# Patient Record
Sex: MALE | Race: WHITE | Employment: OTHER | URBAN - NONMETROPOLITAN AREA
[De-identification: names, ages, dates, MRNs, and addresses within clinical notes are randomized per-mention and may not be internally consistent; named-entity substitution may affect disease eponyms.]

---

## 2018-07-14 ENCOUNTER — APPOINTMENT (OUTPATIENT)
Dept: CT IMAGING | Age: 53
End: 2018-07-14

## 2018-07-14 ENCOUNTER — HOSPITAL ENCOUNTER (EMERGENCY)
Age: 53
Discharge: HOME | End: 2018-07-14

## 2018-07-14 VITALS
SYSTOLIC BLOOD PRESSURE: 130 MMHG | OXYGEN SATURATION: 97 % | WEIGHT: 150 LBS | HEIGHT: 70 IN | RESPIRATION RATE: 16 BRPM | HEART RATE: 74 BPM | BODY MASS INDEX: 21.47 KG/M2 | DIASTOLIC BLOOD PRESSURE: 82 MMHG | TEMPERATURE: 98 F
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
## 2018-07-14 DIAGNOSIS — S01.81XA FACIAL LACERATION, INITIAL ENCOUNTER: ICD-10-CM

## 2018-07-14 DIAGNOSIS — S00.93XA CONTUSION OF HEAD, UNSPECIFIED PART OF HEAD, INITIAL ENCOUNTER: ICD-10-CM

## 2018-07-14 PROCEDURE — 99283 EMERGENCY DEPT VISIT LOW MDM: CPT

## 2018-07-14 PROCEDURE — 90471 IMMUNIZATION ADMIN: CPT | Performed by: PHYSICIAN ASSISTANT

## 2018-07-14 PROCEDURE — 70486 CT MAXILLOFACIAL W/O DYE: CPT

## 2018-07-14 PROCEDURE — 12013 RPR F/E/E/N/L/M 2.6-5.0 CM: CPT

## 2018-07-14 PROCEDURE — 6360000002 HC RX W HCPCS: Performed by: PHYSICIAN ASSISTANT

## 2018-07-14 PROCEDURE — 90715 TDAP VACCINE 7 YRS/> IM: CPT | Performed by: PHYSICIAN ASSISTANT

## 2018-07-14 PROCEDURE — 70450 CT HEAD/BRAIN W/O DYE: CPT

## 2018-07-14 RX ORDER — ACETAMINOPHEN 500 MG
1000 TABLET ORAL DAILY
COMMUNITY

## 2018-07-14 RX ORDER — LIDOCAINE HYDROCHLORIDE 10 MG/ML
INJECTION, SOLUTION INFILTRATION; PERINEURAL
Status: DISCONTINUED
Start: 2018-07-14 | End: 2018-07-14 | Stop reason: HOSPADM

## 2018-07-14 RX ADMIN — TETANUS TOXOID, REDUCED DIPHTHERIA TOXOID AND ACELLULAR PERTUSSIS VACCINE, ADSORBED 0.5 ML: 5; 2.5; 8; 8; 2.5 SUSPENSION INTRAMUSCULAR at 09:33

## 2018-07-14 ASSESSMENT — ENCOUNTER SYMPTOMS
EYE PAIN: 0
SORE THROAT: 0
HEMOPTYSIS: 0
ABDOMINAL PAIN: 0
COUGH: 0
NAUSEA: 0
HEARTBURN: 0
VOMITING: 0
STRIDOR: 0
SHORTNESS OF BREATH: 0
DIARRHEA: 0

## 2018-07-14 ASSESSMENT — PAIN SCALES - GENERAL: PAINLEVEL_OUTOF10: 5

## 2018-07-14 ASSESSMENT — PAIN DESCRIPTION - DESCRIPTORS: DESCRIPTORS: DISCOMFORT

## 2018-07-14 ASSESSMENT — PAIN DESCRIPTION - PAIN TYPE: TYPE: ACUTE PAIN

## 2018-07-14 ASSESSMENT — PAIN DESCRIPTION - ORIENTATION: ORIENTATION: LEFT;UPPER

## 2018-07-14 ASSESSMENT — PAIN DESCRIPTION - LOCATION: LOCATION: EYE
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
## 2020-05-20 ENCOUNTER — HOSPITAL ENCOUNTER (OUTPATIENT)
Age: 55
Discharge: HOME | End: 2020-05-20
Payer: MEDICARE

## 2020-05-20 ENCOUNTER — HOSPITAL ENCOUNTER (OUTPATIENT)
Dept: GENERAL RADIOLOGY | Age: 55
Discharge: HOME | End: 2020-05-20
Payer: MEDICARE

## 2020-05-20 PROCEDURE — 73030 X-RAY EXAM OF SHOULDER: CPT

## 2020-07-21 ENCOUNTER — HOSPITAL ENCOUNTER (OUTPATIENT)
Dept: NON INVASIVE DIAGNOSTICS | Age: 55
Discharge: HOME | End: 2020-07-21
Payer: MEDICARE

## 2020-07-21 ENCOUNTER — HOSPITAL ENCOUNTER (OUTPATIENT)
Age: 55
Discharge: HOME | End: 2020-07-21
Payer: MEDICARE

## 2020-07-21 VITALS — WEIGHT: 150 LBS | BODY MASS INDEX: 22.22 KG/M2 | HEIGHT: 69 IN

## 2020-07-21 PROCEDURE — 78452 HT MUSCLE IMAGE SPECT MULT: CPT

## 2020-07-21 PROCEDURE — 93017 CV STRESS TEST TRACING ONLY: CPT

## 2020-07-21 PROCEDURE — 6360000002 HC RX W HCPCS

## 2020-07-21 PROCEDURE — U0003 INFECTIOUS AGENT DETECTION BY NUCLEIC ACID (DNA OR RNA); SEVERE ACUTE RESPIRATORY SYNDROME CORONAVIRUS 2 (SARS-COV-2) (CORONAVIRUS DISEASE [COVID-19]), AMPLIFIED PROBE TECHNIQUE, MAKING USE OF HIGH THROUGHPUT TECHNOLOGIES AS DESCRIBED BY CMS-2020-01-R: HCPCS

## 2020-07-21 PROCEDURE — A9500 TC99M SESTAMIBI: HCPCS | Performed by: INTERNAL MEDICINE

## 2020-07-21 PROCEDURE — 3430000000 HC RX DIAGNOSTIC RADIOPHARMACEUTICAL: Performed by: INTERNAL MEDICINE

## 2020-07-21 RX ADMIN — Medication 28.6 MILLICURIE: at 10:08

## 2020-07-21 RX ADMIN — Medication 8.9 MILLICURIE: at 09:10

## 2020-07-21 NOTE — PROGRESS NOTES
PAT call attempted, patient unavailable, left message to please call us back at your earliest convenience; [phone]

## 2020-07-21 NOTE — PROGRESS NOTES
Cervical Surgery Pre-op Instructions   Nothing to eat or drink after midnight the night before surgery except sips of water to take medications   Bring photo ID and any health insurance cards   Wear comfortable clean loose fitting clothing   Do not wear any jewelry    Bring your medication in the original bottles   Bring your CPAP machine if you have one   Wear clean clothes to bed and place fresh clean sheets and pillowcases on your bed the night before surgery   Bathing:  o __x__Shower 2 days before, day before and morning of surgery using the StartClean® kit provided (follow the instructions provided with the kit), OR   o ____Cleanse your surgical site, 2 days before, day before and morning of surgery using the Raymundo 2% CHG cloths® provided (follow the instructions provided).  Do not shave the surgical area! If needed, your nurse will use clippers to remove any excess hair at the surgical site when you come in for surgery. Do not use a razor on the site of your surgery for at least 2 days prior to surgery because shaving can leave tiny nicks in the skin which may allow germs to enter and cause infection. If you have any questions about your preoperative preparations, please call the [address] [place] [address] department at [phone].

## 2020-07-24 LAB — SARS-COV-2: NOT DETECTED

## 2020-07-24 NOTE — PROGRESS NOTES
Called medical records at [facility] and left message for [facility] requesting copy of clearance

## 2020-07-27 ENCOUNTER — HOSPITAL ENCOUNTER (INPATIENT)
Age: 55
LOS: 1 days | Discharge: HOME | DRG: 472 | End: 2020-07-28
Attending: ORTHOPAEDIC SURGERY | Admitting: ORTHOPAEDIC SURGERY
Payer: MEDICARE

## 2020-07-27 ENCOUNTER — ANESTHESIA (OUTPATIENT)
Dept: OPERATING ROOM | Age: 55
DRG: 472 | End: 2020-07-27
Payer: MEDICARE

## 2020-07-27 ENCOUNTER — APPOINTMENT (OUTPATIENT)
Dept: GENERAL RADIOLOGY | Age: 55
DRG: 472 | End: 2020-07-27
Attending: ORTHOPAEDIC SURGERY
Payer: MEDICARE

## 2020-07-27 ENCOUNTER — ANESTHESIA EVENT (OUTPATIENT)
Dept: OPERATING ROOM | Age: 55
DRG: 472 | End: 2020-07-27
Payer: MEDICARE

## 2020-07-27 VITALS
RESPIRATION RATE: 8 BRPM | TEMPERATURE: 97.2 F | SYSTOLIC BLOOD PRESSURE: 116 MMHG | OXYGEN SATURATION: 100 % | DIASTOLIC BLOOD PRESSURE: 69 MMHG

## 2020-07-27 PROBLEM — M43.12 SPONDYLOLISTHESIS OF CERVICAL REGION: Status: ACTIVE | Noted: 2020-07-27

## 2020-07-27 LAB
ABO: NORMAL
ANTIBODY SCREEN: NORMAL
RH FACTOR: NORMAL

## 2020-07-27 PROCEDURE — 6360000002 HC RX W HCPCS: Performed by: PHYSICIAN ASSISTANT

## 2020-07-27 PROCEDURE — 36415 COLL VENOUS BLD VENIPUNCTURE: CPT

## 2020-07-27 PROCEDURE — 86900 BLOOD TYPING SEROLOGIC ABO: CPT

## 2020-07-27 PROCEDURE — 94640 AIRWAY INHALATION TREATMENT: CPT

## 2020-07-27 PROCEDURE — 72020 X-RAY EXAM OF SPINE 1 VIEW: CPT

## 2020-07-27 PROCEDURE — 01N10ZZ RELEASE CERVICAL NERVE, OPEN APPROACH: ICD-10-PCS | Performed by: ORTHOPAEDIC SURGERY

## 2020-07-27 PROCEDURE — 3700000001 HC ADD 15 MINUTES (ANESTHESIA): Performed by: ORTHOPAEDIC SURGERY

## 2020-07-27 PROCEDURE — 6360000002 HC RX W HCPCS: Performed by: NURSE ANESTHETIST, CERTIFIED REGISTERED

## 2020-07-27 PROCEDURE — 7100000000 HC PACU RECOVERY - FIRST 15 MIN: Performed by: ORTHOPAEDIC SURGERY

## 2020-07-27 PROCEDURE — 2720000010 HC SURG SUPPLY STERILE: Performed by: ORTHOPAEDIC SURGERY

## 2020-07-27 PROCEDURE — 3600000004 HC SURGERY LEVEL 4 BASE: Performed by: ORTHOPAEDIC SURGERY

## 2020-07-27 PROCEDURE — 86850 RBC ANTIBODY SCREEN: CPT

## 2020-07-27 PROCEDURE — 3700000000 HC ANESTHESIA ATTENDED CARE: Performed by: ORTHOPAEDIC SURGERY

## 2020-07-27 PROCEDURE — 2500000003 HC RX 250 WO HCPCS: Performed by: NURSE ANESTHETIST, CERTIFIED REGISTERED

## 2020-07-27 PROCEDURE — 3600000014 HC SURGERY LEVEL 4 ADDTL 15MIN: Performed by: ORTHOPAEDIC SURGERY

## 2020-07-27 PROCEDURE — 2580000003 HC RX 258: Performed by: ORTHOPAEDIC SURGERY

## 2020-07-27 PROCEDURE — 1200000000 HC SEMI PRIVATE

## 2020-07-27 PROCEDURE — 0RB30ZZ EXCISION OF CERVICAL VERTEBRAL DISC, OPEN APPROACH: ICD-10-PCS | Performed by: ORTHOPAEDIC SURGERY

## 2020-07-27 PROCEDURE — 2500000003 HC RX 250 WO HCPCS: Performed by: ORTHOPAEDIC SURGERY

## 2020-07-27 PROCEDURE — 2709999900 HC NON-CHARGEABLE SUPPLY: Performed by: ORTHOPAEDIC SURGERY

## 2020-07-27 PROCEDURE — L0120 CERV FLEX N/ADJ FOAM PRE OTS: HCPCS | Performed by: ORTHOPAEDIC SURGERY

## 2020-07-27 PROCEDURE — 0RG20A0 FUSION OF 2 OR MORE CERVICAL VERTEBRAL JOINTS WITH INTERBODY FUSION DEVICE, ANTERIOR APPROACH, ANTERIOR COLUMN, OPEN APPROACH: ICD-10-PCS | Performed by: ORTHOPAEDIC SURGERY

## 2020-07-27 PROCEDURE — 94760 N-INVAS EAR/PLS OXIMETRY 1: CPT

## 2020-07-27 PROCEDURE — 6370000000 HC RX 637 (ALT 250 FOR IP): Performed by: PHYSICIAN ASSISTANT

## 2020-07-27 PROCEDURE — 00NW0ZZ RELEASE CERVICAL SPINAL CORD, OPEN APPROACH: ICD-10-PCS | Performed by: ORTHOPAEDIC SURGERY

## 2020-07-27 PROCEDURE — C1713 ANCHOR/SCREW BN/BN,TIS/BN: HCPCS | Performed by: ORTHOPAEDIC SURGERY

## 2020-07-27 PROCEDURE — 0RG20K0 FUSION OF 2 OR MORE CERVICAL VERTEBRAL JOINTS WITH NONAUTOLOGOUS TISSUE SUBSTITUTE, ANTERIOR APPROACH, ANTERIOR COLUMN, OPEN APPROACH: ICD-10-PCS | Performed by: ORTHOPAEDIC SURGERY

## 2020-07-27 PROCEDURE — 2780000010 HC IMPLANT OTHER: Performed by: ORTHOPAEDIC SURGERY

## 2020-07-27 PROCEDURE — 86901 BLOOD TYPING SEROLOGIC RH(D): CPT

## 2020-07-27 PROCEDURE — 2580000003 HC RX 258: Performed by: PHYSICIAN ASSISTANT

## 2020-07-27 PROCEDURE — 7100000001 HC PACU RECOVERY - ADDTL 15 MIN: Performed by: ORTHOPAEDIC SURGERY

## 2020-07-27 PROCEDURE — 6370000000 HC RX 637 (ALT 250 FOR IP): Performed by: ANESTHESIOLOGY

## 2020-07-27 DEVICE — DBM T41120 1CC GRAFTON GEL
Type: IMPLANTABLE DEVICE | Site: NECK | Status: FUNCTIONAL
Brand: GRAFTON®AND GRAFTON PLUS®DEMINERALIZED BONE MATRIX (DBM)

## 2020-07-27 DEVICE — SCREW 3120515 4.0 X 15 SELF DRILL VAR
Type: IMPLANTABLE DEVICE | Site: NECK | Status: FUNCTIONAL
Brand: ATLANTIS® ANTERIOR CERVICAL PLATE SYSTEM

## 2020-07-27 DEVICE — GRAFT HUM TISS W11XH7XL11MM CORT INTBDY FUS FRZ DRY BLK: Type: IMPLANTABLE DEVICE | Site: NECK | Status: FUNCTIONAL

## 2020-07-27 DEVICE — GRAFT BNE SUB W11XH7XL14MM CORT INTBDY FUS FRZ DRY BLK SPCR: Type: IMPLANTABLE DEVICE | Site: NECK | Status: FUNCTIONAL

## 2020-07-27 DEVICE — AGENT HEMOSTATIC SURGIFLOW MATRIX KIT W/THROMBIN: Type: IMPLANTABLE DEVICE | Site: NECK | Status: FUNCTIONAL

## 2020-07-27 RX ORDER — LABETALOL 20 MG/4 ML (5 MG/ML) INTRAVENOUS SYRINGE
10 EVERY 10 MIN PRN
Status: DISCONTINUED | OUTPATIENT
Start: 2020-07-27 | End: 2020-07-27 | Stop reason: HOSPADM

## 2020-07-27 RX ORDER — CYCLOBENZAPRINE HCL 10 MG
10 TABLET ORAL 3 TIMES DAILY PRN
Status: DISCONTINUED | OUTPATIENT
Start: 2020-07-27 | End: 2020-07-28 | Stop reason: HOSPADM

## 2020-07-27 RX ORDER — OXYCODONE HYDROCHLORIDE AND ACETAMINOPHEN 5; 325 MG/1; MG/1
2 TABLET ORAL EVERY 4 HOURS PRN
Status: DISCONTINUED | OUTPATIENT
Start: 2020-07-27 | End: 2020-07-28 | Stop reason: HOSPADM

## 2020-07-27 RX ORDER — ONDANSETRON 2 MG/ML
4 INJECTION INTRAMUSCULAR; INTRAVENOUS EVERY 6 HOURS PRN
Status: DISCONTINUED | OUTPATIENT
Start: 2020-07-27 | End: 2020-07-28 | Stop reason: HOSPADM

## 2020-07-27 RX ORDER — POLYETHYLENE GLYCOL 3350 17 G/17G
17 POWDER, FOR SOLUTION ORAL DAILY
Status: DISCONTINUED | OUTPATIENT
Start: 2020-07-27 | End: 2020-07-28 | Stop reason: HOSPADM

## 2020-07-27 RX ORDER — FENTANYL CITRATE 50 UG/ML
50 INJECTION, SOLUTION INTRAMUSCULAR; INTRAVENOUS EVERY 5 MIN PRN
Status: DISCONTINUED | OUTPATIENT
Start: 2020-07-27 | End: 2020-07-27 | Stop reason: HOSPADM

## 2020-07-27 RX ORDER — MORPHINE SULFATE 2 MG/ML
2 INJECTION, SOLUTION INTRAMUSCULAR; INTRAVENOUS EVERY 5 MIN PRN
Status: DISCONTINUED | OUTPATIENT
Start: 2020-07-27 | End: 2020-07-27 | Stop reason: HOSPADM

## 2020-07-27 RX ORDER — DEXAMETHASONE SODIUM PHOSPHATE 4 MG/ML
8 INJECTION, SOLUTION INTRA-ARTICULAR; INTRALESIONAL; INTRAMUSCULAR; INTRAVENOUS; SOFT TISSUE EVERY 8 HOURS
Status: COMPLETED | OUTPATIENT
Start: 2020-07-27 | End: 2020-07-28

## 2020-07-27 RX ORDER — SODIUM CHLORIDE 9 MG/ML
INJECTION, SOLUTION INTRAVENOUS CONTINUOUS
Status: DISCONTINUED | OUTPATIENT
Start: 2020-07-27 | End: 2020-07-28 | Stop reason: HOSPADM

## 2020-07-27 RX ORDER — OXYCODONE HYDROCHLORIDE AND ACETAMINOPHEN 5; 325 MG/1; MG/1
1 TABLET ORAL EVERY 4 HOURS PRN
Status: DISCONTINUED | OUTPATIENT
Start: 2020-07-27 | End: 2020-07-28 | Stop reason: HOSPADM

## 2020-07-27 RX ORDER — ACETAMINOPHEN 325 MG/1
650 TABLET ORAL EVERY 4 HOURS PRN
Status: DISCONTINUED | OUTPATIENT
Start: 2020-07-27 | End: 2020-07-28 | Stop reason: HOSPADM

## 2020-07-27 RX ORDER — NEOSTIGMINE METHYLSULFATE 5 MG/5 ML
SYRINGE (ML) INTRAVENOUS PRN
Status: DISCONTINUED | OUTPATIENT
Start: 2020-07-27 | End: 2020-07-27 | Stop reason: SDUPTHER

## 2020-07-27 RX ORDER — IPRATROPIUM BROMIDE AND ALBUTEROL SULFATE 2.5; .5 MG/3ML; MG/3ML
1 SOLUTION RESPIRATORY (INHALATION) ONCE
Status: COMPLETED | OUTPATIENT
Start: 2020-07-27 | End: 2020-07-27

## 2020-07-27 RX ORDER — PROMETHAZINE HYDROCHLORIDE 25 MG/1
12.5 TABLET ORAL EVERY 6 HOURS PRN
Status: DISCONTINUED | OUTPATIENT
Start: 2020-07-27 | End: 2020-07-28 | Stop reason: HOSPADM

## 2020-07-27 RX ORDER — GLYCOPYRROLATE 1 MG/5 ML
SYRINGE (ML) INTRAVENOUS PRN
Status: DISCONTINUED | OUTPATIENT
Start: 2020-07-27 | End: 2020-07-27 | Stop reason: SDUPTHER

## 2020-07-27 RX ORDER — SODIUM CHLORIDE 0.9 % (FLUSH) 0.9 %
10 SYRINGE (ML) INJECTION EVERY 12 HOURS SCHEDULED
Status: DISCONTINUED | OUTPATIENT
Start: 2020-07-27 | End: 2020-07-28 | Stop reason: HOSPADM

## 2020-07-27 RX ORDER — SODIUM CHLORIDE 0.9 % (FLUSH) 0.9 %
10 SYRINGE (ML) INJECTION PRN
Status: DISCONTINUED | OUTPATIENT
Start: 2020-07-27 | End: 2020-07-28 | Stop reason: HOSPADM

## 2020-07-27 RX ORDER — LIDOCAINE HCL/PF 100 MG/5ML
SYRINGE (ML) INJECTION PRN
Status: DISCONTINUED | OUTPATIENT
Start: 2020-07-27 | End: 2020-07-27 | Stop reason: SDUPTHER

## 2020-07-27 RX ORDER — ROCURONIUM BROMIDE 10 MG/ML
INJECTION, SOLUTION INTRAVENOUS PRN
Status: DISCONTINUED | OUTPATIENT
Start: 2020-07-27 | End: 2020-07-27 | Stop reason: SDUPTHER

## 2020-07-27 RX ORDER — LIDOCAINE HYDROCHLORIDE AND EPINEPHRINE 10; 10 MG/ML; UG/ML
INJECTION, SOLUTION INFILTRATION; PERINEURAL PRN
Status: DISCONTINUED | OUTPATIENT
Start: 2020-07-27 | End: 2020-07-27 | Stop reason: ALTCHOICE

## 2020-07-27 RX ORDER — SODIUM PHOSPHATE, DIBASIC AND SODIUM PHOSPHATE, MONOBASIC 7; 19 G/133ML; G/133ML
1 ENEMA RECTAL DAILY PRN
Status: DISCONTINUED | OUTPATIENT
Start: 2020-07-27 | End: 2020-07-28 | Stop reason: HOSPADM

## 2020-07-27 RX ORDER — SODIUM CHLORIDE 9 MG/ML
INJECTION, SOLUTION INTRAVENOUS CONTINUOUS
Status: DISCONTINUED | OUTPATIENT
Start: 2020-07-27 | End: 2020-07-27

## 2020-07-27 RX ORDER — ONDANSETRON 2 MG/ML
INJECTION INTRAMUSCULAR; INTRAVENOUS PRN
Status: DISCONTINUED | OUTPATIENT
Start: 2020-07-27 | End: 2020-07-27 | Stop reason: SDUPTHER

## 2020-07-27 RX ORDER — DEXAMETHASONE SODIUM PHOSPHATE 4 MG/ML
INJECTION, SOLUTION INTRA-ARTICULAR; INTRALESIONAL; INTRAMUSCULAR; INTRAVENOUS; SOFT TISSUE PRN
Status: DISCONTINUED | OUTPATIENT
Start: 2020-07-27 | End: 2020-07-27 | Stop reason: SDUPTHER

## 2020-07-27 RX ORDER — EPHEDRINE SULFATE/0.9% NACL/PF 50 MG/5 ML
SYRINGE (ML) INTRAVENOUS PRN
Status: DISCONTINUED | OUTPATIENT
Start: 2020-07-27 | End: 2020-07-27 | Stop reason: SDUPTHER

## 2020-07-27 RX ORDER — BISACODYL 10 MG
10 SUPPOSITORY, RECTAL RECTAL DAILY PRN
Status: DISCONTINUED | OUTPATIENT
Start: 2020-07-27 | End: 2020-07-28 | Stop reason: HOSPADM

## 2020-07-27 RX ORDER — FENTANYL CITRATE 50 UG/ML
INJECTION, SOLUTION INTRAMUSCULAR; INTRAVENOUS PRN
Status: DISCONTINUED | OUTPATIENT
Start: 2020-07-27 | End: 2020-07-27 | Stop reason: SDUPTHER

## 2020-07-27 RX ORDER — SODIUM CHLORIDE 0.9 % (FLUSH) 0.9 %
10 SYRINGE (ML) INJECTION EVERY 12 HOURS SCHEDULED
Status: DISCONTINUED | OUTPATIENT
Start: 2020-07-27 | End: 2020-07-27 | Stop reason: HOSPADM

## 2020-07-27 RX ORDER — PROPOFOL 10 MG/ML
INJECTION, EMULSION INTRAVENOUS PRN
Status: DISCONTINUED | OUTPATIENT
Start: 2020-07-27 | End: 2020-07-27 | Stop reason: SDUPTHER

## 2020-07-27 RX ORDER — SODIUM CHLORIDE 0.9 % (FLUSH) 0.9 %
10 SYRINGE (ML) INJECTION PRN
Status: DISCONTINUED | OUTPATIENT
Start: 2020-07-27 | End: 2020-07-27 | Stop reason: HOSPADM

## 2020-07-27 RX ADMIN — Medication 20 MG: at 08:09

## 2020-07-27 RX ADMIN — PROPOFOL 150 MG: 10 INJECTION, EMULSION INTRAVENOUS at 07:30

## 2020-07-27 RX ADMIN — CEFAZOLIN 2 G: 10 INJECTION, POWDER, FOR SOLUTION INTRAVENOUS at 07:41

## 2020-07-27 RX ADMIN — FENTANYL CITRATE 50 MCG: 50 INJECTION, SOLUTION INTRAMUSCULAR; INTRAVENOUS at 09:44

## 2020-07-27 RX ADMIN — SODIUM CHLORIDE: 9 INJECTION, SOLUTION INTRAVENOUS at 20:50

## 2020-07-27 RX ADMIN — SODIUM CHLORIDE: 9 INJECTION, SOLUTION INTRAVENOUS at 13:56

## 2020-07-27 RX ADMIN — ROCURONIUM BROMIDE 20 MG: 10 INJECTION INTRAVENOUS at 07:49

## 2020-07-27 RX ADMIN — NALOXEGOL OXALATE 12.5 MG: 12.5 TABLET, FILM COATED ORAL at 18:31

## 2020-07-27 RX ADMIN — DEXAMETHASONE SODIUM PHOSPHATE 8 MG: 4 INJECTION, SOLUTION INTRAMUSCULAR; INTRAVENOUS at 22:35

## 2020-07-27 RX ADMIN — Medication 0.6 MG: at 10:12

## 2020-07-27 RX ADMIN — OXYCODONE HYDROCHLORIDE AND ACETAMINOPHEN 2 TABLET: 5; 325 TABLET ORAL at 20:49

## 2020-07-27 RX ADMIN — SODIUM CHLORIDE: 9 INJECTION, SOLUTION INTRAVENOUS at 22:39

## 2020-07-27 RX ADMIN — ROCURONIUM BROMIDE 50 MG: 10 INJECTION INTRAVENOUS at 07:30

## 2020-07-27 RX ADMIN — Medication 100 MG: at 07:30

## 2020-07-27 RX ADMIN — CEFAZOLIN 2 G: 10 INJECTION, POWDER, FOR SOLUTION INTRAVENOUS at 22:35

## 2020-07-27 RX ADMIN — POLYETHYLENE GLYCOL 3350 17 G: 17 POWDER, FOR SOLUTION ORAL at 18:31

## 2020-07-27 RX ADMIN — Medication 4 MG: at 10:12

## 2020-07-27 RX ADMIN — FENTANYL CITRATE 50 MCG: 50 INJECTION, SOLUTION INTRAMUSCULAR; INTRAVENOUS at 08:19

## 2020-07-27 RX ADMIN — DEXAMETHASONE SODIUM PHOSPHATE 8 MG: 4 INJECTION, SOLUTION INTRAMUSCULAR; INTRAVENOUS at 07:49

## 2020-07-27 RX ADMIN — ONDANSETRON HYDROCHLORIDE 4 MG: 4 INJECTION, SOLUTION INTRAMUSCULAR; INTRAVENOUS at 09:59

## 2020-07-27 RX ADMIN — FENTANYL CITRATE 100 MCG: 50 INJECTION, SOLUTION INTRAMUSCULAR; INTRAVENOUS at 07:30

## 2020-07-27 RX ADMIN — CEFAZOLIN 2 G: 10 INJECTION, POWDER, FOR SOLUTION INTRAVENOUS at 14:41

## 2020-07-27 RX ADMIN — IPRATROPIUM BROMIDE AND ALBUTEROL SULFATE 1 AMPULE: .5; 3 SOLUTION RESPIRATORY (INHALATION) at 07:11

## 2020-07-27 RX ADMIN — Medication 10 MG: at 09:36

## 2020-07-27 RX ADMIN — CYCLOBENZAPRINE HYDROCHLORIDE 10 MG: 10 TABLET, FILM COATED ORAL at 20:49

## 2020-07-27 RX ADMIN — ROCURONIUM BROMIDE 10 MG: 10 INJECTION INTRAVENOUS at 08:11

## 2020-07-27 RX ADMIN — SODIUM CHLORIDE: 9 INJECTION, SOLUTION INTRAVENOUS at 09:34

## 2020-07-27 RX ADMIN — DEXAMETHASONE SODIUM PHOSPHATE 8 MG: 4 INJECTION, SOLUTION INTRAMUSCULAR; INTRAVENOUS at 14:41

## 2020-07-27 RX ADMIN — SODIUM CHLORIDE: 9 INJECTION, SOLUTION INTRAVENOUS at 06:21

## 2020-07-27 ASSESSMENT — PULMONARY FUNCTION TESTS
PIF_VALUE: 19
PIF_VALUE: 22
PIF_VALUE: 24
PIF_VALUE: 24
PIF_VALUE: 20
PIF_VALUE: 2
PIF_VALUE: 24
PIF_VALUE: 22
PIF_VALUE: 22
PIF_VALUE: 23
PIF_VALUE: 20
PIF_VALUE: 19
PIF_VALUE: 2
PIF_VALUE: 25
PIF_VALUE: 1
PIF_VALUE: 23
PIF_VALUE: 21
PIF_VALUE: 24
PIF_VALUE: 24
PIF_VALUE: 18
PIF_VALUE: 22
PIF_VALUE: 0
PIF_VALUE: 18
PIF_VALUE: 23
PIF_VALUE: 24
PIF_VALUE: 23
PIF_VALUE: 24
PIF_VALUE: 20
PIF_VALUE: 21
PIF_VALUE: 22
PIF_VALUE: 20
PIF_VALUE: 20
PIF_VALUE: 22
PIF_VALUE: 23
PIF_VALUE: 23
PIF_VALUE: 2
PIF_VALUE: 19
PIF_VALUE: 23
PIF_VALUE: 20
PIF_VALUE: 23
PIF_VALUE: 23
PIF_VALUE: 22
PIF_VALUE: 20
PIF_VALUE: 23
PIF_VALUE: 23
PIF_VALUE: 19
PIF_VALUE: 23
PIF_VALUE: 20
PIF_VALUE: 23
PIF_VALUE: 21
PIF_VALUE: 25
PIF_VALUE: 23
PIF_VALUE: 22
PIF_VALUE: 22
PIF_VALUE: 19
PIF_VALUE: 23
PIF_VALUE: 19
PIF_VALUE: 18
PIF_VALUE: 23
PIF_VALUE: 24
PIF_VALUE: 20
PIF_VALUE: 22
PIF_VALUE: 2
PIF_VALUE: 21
PIF_VALUE: 19
PIF_VALUE: 23
PIF_VALUE: 2
PIF_VALUE: 24
PIF_VALUE: 26
PIF_VALUE: 22
PIF_VALUE: 22
PIF_VALUE: 19
PIF_VALUE: 24
PIF_VALUE: 22
PIF_VALUE: 22
PIF_VALUE: 1
PIF_VALUE: 19
PIF_VALUE: 23
PIF_VALUE: 23
PIF_VALUE: 20
PIF_VALUE: 1
PIF_VALUE: 23
PIF_VALUE: 24
PIF_VALUE: 24
PIF_VALUE: 20
PIF_VALUE: 20
PIF_VALUE: 21
PIF_VALUE: 24
PIF_VALUE: 22
PIF_VALUE: 24
PIF_VALUE: 24
PIF_VALUE: 23
PIF_VALUE: 24
PIF_VALUE: 23
PIF_VALUE: 22
PIF_VALUE: 24
PIF_VALUE: 22
PIF_VALUE: 20
PIF_VALUE: 22
PIF_VALUE: 24
PIF_VALUE: 24
PIF_VALUE: 20
PIF_VALUE: 24
PIF_VALUE: 23
PIF_VALUE: 22
PIF_VALUE: 23
PIF_VALUE: 1
PIF_VALUE: 19
PIF_VALUE: 23
PIF_VALUE: 21
PIF_VALUE: 23
PIF_VALUE: 22
PIF_VALUE: 25
PIF_VALUE: 23
PIF_VALUE: 20
PIF_VALUE: 23
PIF_VALUE: 6
PIF_VALUE: 20
PIF_VALUE: 20
PIF_VALUE: 22
PIF_VALUE: 23
PIF_VALUE: 23
PIF_VALUE: 24
PIF_VALUE: 23
PIF_VALUE: 24
PIF_VALUE: 22
PIF_VALUE: 18
PIF_VALUE: 22
PIF_VALUE: 23
PIF_VALUE: 23
PIF_VALUE: 24
PIF_VALUE: 24
PIF_VALUE: 20
PIF_VALUE: 20
PIF_VALUE: 23
PIF_VALUE: 24
PIF_VALUE: 22
PIF_VALUE: 18
PIF_VALUE: 24
PIF_VALUE: 20
PIF_VALUE: 22
PIF_VALUE: 24
PIF_VALUE: 22
PIF_VALUE: 23
PIF_VALUE: 23
PIF_VALUE: 24
PIF_VALUE: 24
PIF_VALUE: 19
PIF_VALUE: 20
PIF_VALUE: 23
PIF_VALUE: 19
PIF_VALUE: 21
PIF_VALUE: 19
PIF_VALUE: 24
PIF_VALUE: 21
PIF_VALUE: 23
PIF_VALUE: 21
PIF_VALUE: 24
PIF_VALUE: 8
PIF_VALUE: 23
PIF_VALUE: 22
PIF_VALUE: 19
PIF_VALUE: 22
PIF_VALUE: 26
PIF_VALUE: 0

## 2020-07-27 ASSESSMENT — PAIN SCALES - GENERAL
PAINLEVEL_OUTOF10: 4
PAINLEVEL_OUTOF10: 0
PAINLEVEL_OUTOF10: 5

## 2020-07-27 ASSESSMENT — PAIN - FUNCTIONAL ASSESSMENT: PAIN_FUNCTIONAL_ASSESSMENT: 0-10

## 2020-07-27 NOTE — H&P
[address] [id number]                       PREOPERATIVE HISTORY AND PHYSICAL    PATIENT NAME: Savita Marroquin                   :        1965  MED REC NO:   [id number]                           ROOM:  ACCOUNT NO:   [de-identified]                           ADMIT DATE: 2020  PROVIDER:     Iram Summers PA-C    He is a patient of Dr. Leighton Clifton who will be undergoing an ACDF of C3  to C7 with allograft bone and plating tomorrow, 2020, at [facility]. CHIEF COMPLAINT:  Cervical pain and right upper extremity radiculopathy. HISTORY OF PRESENT ILLNESS:  The patient is a 63-year-old male who  presents to the office today for preoperative history and physical.  He  is a patient who has been dealing with significant cervical pain and  worsening trapezial pain associated with symptoms of neck pain and right  upper extremity symptoms of pain that have been bothering him now for  quite some time. These symptoms radiate from the neck into the right  lateral forearm on the right hand side. There is also increased  frequency of dropping of objects and loss of upper extremity strength in  the right hand side. These symptoms have continued to worsen and failed  to improve despite the use of Tylenol, chiropractic care, and massage. MRI scan showed significant stenosis from level of C3 to C7. As he has  failed conservative treatment, he has elected to proceed with further  operative management. He has been medically cleared by his family provider, Dr. Sina James,  to undergo this operation. DRUG ALLERGIES:  No known drug allergies. PAST MEDICAL HISTORY:  Significant for asthma, depression, and anxiety. CURRENT MEDICATIONS:  Include Tylenol, albuterol, and fish oil. PAST SURGICAL HISTORY:  Includes a wound debridement in .     SOCIAL HISTORY:  Smoking status:  He discontinued smoking on the date of  06/30/2020. He lives at home alone in a private home. He has been  disabled since the year 2017. REVIEW OF SYSTEMS:  GENERAL:  Denies fever, fatigue, or chills. RESPIRATORY:  Denies shortness of breath, productive cough, or wheezing. CARDIOVASCULAR:  Denies chest pain, palpitations, or leg swelling. GASTROINTESTINAL:  Denies nausea, vomiting, diarrhea, or abdominal pain. GENITOURINARY:  Denies dysuria or bladder incontinence. NEUROLOGIC:  Denies seizures, blackouts, fainting, or headaches. MUSCULOSKELETAL:  Significant for neck pain, arm pain, shoulder pain,  and joint pain. PHYSICAL EXAMINATION:  VITAL SIGNS:  Most recent vital signs show height 5 feet 11 inches,  weight 150 pounds, BMI 20.92. GENERAL:  The patient is pleasant, cooperative, awake, alert, and  oriented x3. Seated in a chair in no acute distress. EXTREMITIES:  Examination of the bilateral upper extremities shows skin  is warm, dry, and intact. Pulses are +2 in the radial arteries. Strength is maintained 5/5 throughout with , finger extension, wrist  extension, elbow flexion, elbow extension, and shoulder abduction. Sensation is fully intact throughout the upper extremities. IMAGING:  Cervical MRI scan shows grade 1 anterolisthesis of C4-C5. There is also significant right hand side facet arthropathy and abutment  of the exiting right hand side C5 nerve root to the C4-C5 foramen. The  level of C5-C6 shows displacement and spondylosis producing a narrowing  of the central canal foramina bilateral, left worse than right. The  left C6-C7 also shows mixed spondylosis and encroachment upon the thecal  sac and the exiting C7 nerve roots bilateral, this is worse on the right  hand side than left hand side. The C3-C4 level shows severe stenosis in  the bilateral foramen. ASSESSMENT:  1. Cervical pain. 2.  Cervical spondylosis with radiculopathy.     PLAN:  The plan moving forward with the patient is for him to
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
## 2020-07-27 NOTE — ANESTHESIA POSTPROCEDURE EVALUATION
Department of Anesthesiology  Postprocedure Note    Patient: Darrick Yañez  MRN: [id number]  YOB: 1965  Date of evaluation: 7/27/2020  Time:  12:06 PM     Procedure Summary     Date:  07/27/20 Room / Location:  [address] [place]    Anesthesia Start:  0724 Anesthesia Stop:  4241    Procedure:  ACDF C3-C7 WITH MEDTRONIC (N/A Neck) Diagnosis:  (SPONDYLOLISTHESIS OF CERVICAL REGION, CERVICAL DISC DEGENERATION AT C5-C6 LEVEL, AND C6-C7 LEVEL)    Surgeon:  Samara Santillan MD Responsible Provider:  Laura Mota MD    Anesthesia Type:  general ASA Status:  3          Anesthesia Type: general    Robin Phase I: Robin Score: 8    Robin Phase II:      Last vitals: Reviewed and per EMR flowsheets.        Anesthesia Post Evaluation    Patient location during evaluation: PACU  Patient participation: complete - patient participated  Level of consciousness: awake  Airway patency: patent  Nausea & Vomiting: no vomiting and no nausea  Complications: no  Cardiovascular status: hemodynamically stable  Respiratory status: acceptable and nasal cannula  Hydration status: stable

## 2020-07-27 NOTE — PROGRESS NOTES
Pt and family oriented to SDS 19 and unit. Pt verbalized approval for first name, last initial and physician name on unit whiteboard. Fall band applied. Vaccine information given. Plan of care reviewed.

## 2020-07-28 VITALS
SYSTOLIC BLOOD PRESSURE: 109 MMHG | HEART RATE: 104 BPM | BODY MASS INDEX: 23.02 KG/M2 | RESPIRATION RATE: 18 BRPM | HEIGHT: 69 IN | WEIGHT: 155.4 LBS | DIASTOLIC BLOOD PRESSURE: 83 MMHG | TEMPERATURE: 98.7 F | OXYGEN SATURATION: 96 %

## 2020-07-28 PROCEDURE — 6360000002 HC RX W HCPCS: Performed by: PHYSICIAN ASSISTANT

## 2020-07-28 PROCEDURE — 51701 INSERT BLADDER CATHETER: CPT

## 2020-07-28 PROCEDURE — 2580000003 HC RX 258: Performed by: PHYSICIAN ASSISTANT

## 2020-07-28 PROCEDURE — 97161 PT EVAL LOW COMPLEX 20 MIN: CPT

## 2020-07-28 PROCEDURE — 94760 N-INVAS EAR/PLS OXIMETRY 1: CPT

## 2020-07-28 PROCEDURE — 51798 US URINE CAPACITY MEASURE: CPT

## 2020-07-28 PROCEDURE — 6370000000 HC RX 637 (ALT 250 FOR IP): Performed by: PHYSICIAN ASSISTANT

## 2020-07-28 RX ORDER — CYCLOBENZAPRINE HCL 10 MG
10 TABLET ORAL 3 TIMES DAILY PRN
Qty: 50 TABLET | Refills: 0 | Status: SHIPPED | OUTPATIENT
Start: 2020-07-28 | End: 2020-08-07

## 2020-07-28 RX ORDER — OXYCODONE HYDROCHLORIDE AND ACETAMINOPHEN 5; 325 MG/1; MG/1
1 TABLET ORAL EVERY 4 HOURS PRN
Qty: 42 TABLET | Refills: 0 | Status: SHIPPED | OUTPATIENT
Start: 2020-07-28 | End: 2020-08-04

## 2020-07-28 RX ADMIN — OXYCODONE HYDROCHLORIDE AND ACETAMINOPHEN 1 TABLET: 5; 325 TABLET ORAL at 07:21

## 2020-07-28 RX ADMIN — OXYCODONE HYDROCHLORIDE AND ACETAMINOPHEN 1 TABLET: 5; 325 TABLET ORAL at 01:12

## 2020-07-28 RX ADMIN — POLYETHYLENE GLYCOL 3350 17 G: 17 POWDER, FOR SOLUTION ORAL at 08:46

## 2020-07-28 RX ADMIN — Medication 10 ML: at 08:47

## 2020-07-28 RX ADMIN — DEXAMETHASONE SODIUM PHOSPHATE 8 MG: 4 INJECTION, SOLUTION INTRAMUSCULAR; INTRAVENOUS at 07:20

## 2020-07-28 RX ADMIN — NALOXEGOL OXALATE 12.5 MG: 12.5 TABLET, FILM COATED ORAL at 08:44

## 2020-07-28 ASSESSMENT — PAIN DESCRIPTION - FREQUENCY: FREQUENCY: CONTINUOUS

## 2020-07-28 ASSESSMENT — PAIN DESCRIPTION - ORIENTATION
ORIENTATION: ANTERIOR
ORIENTATION: LOWER

## 2020-07-28 ASSESSMENT — PAIN DESCRIPTION - DESCRIPTORS
DESCRIPTORS: ACHING
DESCRIPTORS: ACHING

## 2020-07-28 ASSESSMENT — PAIN SCALES - GENERAL
PAINLEVEL_OUTOF10: 1
PAINLEVEL_OUTOF10: 3
PAINLEVEL_OUTOF10: 4
PAINLEVEL_OUTOF10: 2

## 2020-07-28 ASSESSMENT — PAIN DESCRIPTION - PROGRESSION: CLINICAL_PROGRESSION: NOT CHANGED

## 2020-07-28 ASSESSMENT — PAIN DESCRIPTION - PAIN TYPE
TYPE: SURGICAL PAIN
TYPE: CHRONIC PAIN
TYPE: ACUTE PAIN;SURGICAL PAIN

## 2020-07-28 ASSESSMENT — PAIN DESCRIPTION - LOCATION
LOCATION: NECK
LOCATION: BACK
LOCATION: THROAT

## 2020-07-28 ASSESSMENT — PAIN - FUNCTIONAL ASSESSMENT: PAIN_FUNCTIONAL_ASSESSMENT: ACTIVITIES ARE NOT PREVENTED

## 2020-07-28 ASSESSMENT — PAIN DESCRIPTION - ONSET: ONSET: ON-GOING

## 2020-07-28 NOTE — PROGRESS NOTES
[facility]  OCCUPATIONAL THERAPY MISSED TREATMENT NOTE  [facility]  7K-[id number]/014-A      Date: 2020  Patient Name: Darya Fernandez        CSN: [id number]   : 1965  (47 y.o.)  Gender: male                REASON FOR MISSED TREATMENT: Per PT, pt is indep. No indication for OT at this time.

## 2020-07-28 NOTE — PROGRESS NOTES
Department of Orthopedic Surgery  Spine Service  Progress Note        Subjective:   7/28/2020  Huber Celaya is resting in bed. Pain controlled. Mild sore throat. Was straight cathed early this morning due to inability to void. Patient notes a history of difficulty voiding in the past. Would like to be discharged home. Will keep patient today to monitor drains and urinary retention. Vitals  VITALS:  /68   Pulse 84   Temp 97.8 °F (36.6 °C) (Oral)   Resp 16   Ht 5' 8.5\" (1.74 m)   Wt 155 lb 6.4 oz (70.5 kg)   SpO2 95%   BMI 23.28 kg/m²   24HR INTAKE/OUTPUT:    Intake/Output Summary (Last 24 hours) at 7/28/2020 0750  Last data filed at 7/28/2020 0309  Gross per 24 hour   Intake 5441.28 ml   Output 4220 ml   Net 1221.28 ml     URINARY CATHETER OUTPUT (Lara):     DRAIN/TUBE OUTPUT:  Closed/Suction Drain Anterior Neck Bulb 7 Faroese-Output (ml): 30 ml  VENT SETTINGS:  Vent Information  SpO2: 95 %  Additional Respiratory  Assessments  Pulse: 84  Resp: 16  SpO2: 95 %        PHYSICAL EXAM:    Orientation:  alert and oriented to person, place and time    Incision:  dressing in place, clean, dry and intact    Upper Extremity Motor :  deltoids/biceps/triceps/wirst flexion/wrist extension/finger flexion/finger extension 5/5 bilaterally  Upper Extremity Sensory:  Intact C3-T1 distribution    Flatus:  negative    ABNORMAL EXAM FINDINGS:  none    LABS:  No results for input(s): HGB, HCT in the last 72 hours. ASSESSMENT AND PLAN:    Post operative day 1    1:  Monitor labs and drain output  2:  Activity Level:  OOB with therapy  3:  Pain Control:  Controlled  4:  Discharge Planning:  Pending clinical course  5:  Continue bladder scan and straight cath if unable to urinate on own.  If continued, consult urology for evaluation    Electronically signed by Brigida Butler PA-C on 7/28/2020 at 7:47 AM

## 2020-07-28 NOTE — OP NOTE
[address] [place] [address]                                OPERATIVE REPORT    PATIENT NAME: Ebony Lares                   :        1965  MED REC NO:   [id number]                           ROOM:       0014  ACCOUNT NO:   [de-identified]                           ADMIT DATE: 2020  PROVIDER:     Courtney Salcedo M.D.    Cedrick Sisizzard:  2020    PREOPERATIVE DIAGNOSES:  1.  Multilevel cervical spondylosis and stenosis with a resultant      radiculopathy and cervical myelopathy. 2.  Grade 1 spondylolisthesis of C3-C4. 3.  Severe degenerative disk disease of levels of C5-C6 and C6-C7. POSTOPERATIVE DIAGNOSES:  1.  Multilevel cervical spondylosis and stenosis with a resultant      radiculopathy and cervical myelopathy. 2.  Grade 1 spondylolisthesis of C3-C4. 3.  Severe degenerative disk disease of levels of C5-C6 and C6-C7. OPERATIONS PERFORMED:  1. Anterior cervical diskectomies of levels of C3-C4, C4-C5, C5-C6, and      C6-C7.  2.  Anterior cervical interbody fusion of levels of C3-C4, C4-C5, C5-C6,      and C6-C7. 3.  Use of structural bone graft Medtronic [place] bone bank bone      for these four levels of interbody fusion. 4.  Use of the allograft bone DBM [place] for these four levels of      interbody fusion. 5.  Plating of cervical spine with use of an 80-mm Medtronic [place]      Elite plate attached to bone with a total of 10 screws from levels of C3      through C7. SURGEON:  Frederick Saleh MD    ASSISTANT:  Verner Dilling, PA-C    ANESTHESIA:  General.    BLOOD LOSS:  50 mL. DRAINS:  Benitez-Bruce. COMPLICATIONS:  Zero. INDICATIONS:  The patient is 47years of age having symptoms of neck  pain and arm pain with loss of strength and dexterity.   He has been  worked up to include MRI imaging of the cervical spine showing grade 1  spondylolisthesis at level of C3-C4 and shows neurocompression at levels  of C3-C4, C4-C5, C5-C6 and to levels of C6-C7, each level causing  neurocompression and explained the cervical radiculopathic and  myelopathic symptoms. I have recommended that the patient consider  surgery for definitive relief. He feels now necessary to pursue this  operation and wishes to do so on today's date having obtained a medical  clearance from his family physician prior to proceeding. We have  discussed in the office as well the risks, benefits, postop care as well  and have answered all of his questions and today's date, he is agreeable  to proceed with this operation. DESCRIPTION OF PROCEDURE:  We brought the patient to the operating room  whereupon entry, timeout would be observed. His anesthetic was  delivered. Airway was secured. Lara catheter would not be used. He  would then be positioned supine on the operative table with gentle  extension of his head and neck region with his arms also held to side in  a well-padded manner as well. A thorough prepping and draping of the  anterior neck would then be completed with use of a soap scrub solution,  sterile toweling, ChloraPrep solution as well and sterile sheets were  applied, and we would deloris the skin, through the cricoid cartilage at  the carotid tubercle location and then going leftward in a curvilinear  manner measuring 3 inch length of incision. I would then inject 3 mL of  1% lidocaine with epinephrine to the line of incision. He had received  his IV Ancef to completion totaling 2 gm. Skin would be incised. Hemostasis maintained to expose the skin and platysmal layer, dividing  these, maintaining hemostasis, and retracting gently upon the esophagus  and trachea rightward and dividing the omohyoid muscle. I would also  divide the pretracheal fascia and expose levels of C3 through C7 and  place a marking needle at level of C5-C6 with x-rays being taken, levels  of operation could be confirmed.     With levels confirmed, we then began by placing [place] pins at levels of  C3 and C4, brought the microscope in, and would proceed to perform a  complete anterior cervical diskectomy level of C3-C4 with a relief of  the entire neurocompression caused by uncus hypertrophy, take down the  PLL, and fashion endplates for fusion and use a reanna for this purpose. Small [place] rongeurs were used for the posterior decompression so  that the ball-tip probe could pass well through each of these opening  foramina bilateral.  Within this interbody surface which was well  decorticated, a 7 x 11 x 11 mm graft was chosen from the [facility] bone bank which fit very well and would also contain the DBM  gel. We then moved the C3 pin down to level of C5 and for level of C4-C5  would similarly maintain disk space distraction and maintain soft tissue  protection with use of small Garcia retractors with the second  assistant. Under microscope visualization, a complete anterior cervical  diskectomy could be complete with complete relief of neurocompression  and preparation of endplate for fusion with a reanna. All posterior  osteophytes and disk herniation were taken as well posteriorly for  complete relief of neurocompression. The PLL was taken as well. There  was no evidence of any CSF leak. The canal was very thoroughly relieved  and this interbody surface would accept a 7 x 11 x 11 mm tricortical  bone graft with allograft bone filled with use of the DBM gel. I then moved the C4 pin down to level of C6 and for the next level of  C5-C6 would similarly distract the disk space, maintain soft tissue  protection with a microscope, and perform a complete cervical spine  diskectomy. The canal could be thoroughly cleared as well for relief of  neurocompression, all osteophytes taken as well posteriorly as well as  the PLL to completely relieve the neurocompression at this level.   The  ball-tip probe could pass well through each of these opening foramina  post decompression and within this interbody surface, a 7 x 14 x 11 mm  tricortical bone graft was selected containing the DBM gel. This  completes the fusion of levels of C5-C6. We then moved the C5 pin down  to the level of C7 and for level of C6-C7 would then perform a complete  cervical spine diskectomy to relieve the neurocompression at this level,  cleared the canal very thoroughly removing osteophytes posteriorly and  take down the PLL. Within this interbody surface, a graft of a 7 x 14 x  11 mm in size was chosen for this interbody surface, fit very well now  that the canal was very thoroughly cleared and this graft was also  filled with use of the DBM gel. We relaxed the distraction pins and  placed wax in their holes. At this point, all decompressive _____  grafting was performed and we chose an 80-mm Medtronic [place] Elite  plate and would attach this to bone using 10 screws, two per level, and  attach this with appropriate fit of product across the plates with  lordosis. Once seated and the plate attached to bone over each of the  five levels of surgery, we would then engage each locking mechanism,  would take an x-ray that would confirm the appropriate plate position. Irrigation was performed. Wound suctioned dry. We ensured all soft  tissues were free. There was no entrapment of bleeding and we would  then place a Benitez-Bruce drain and close in layers. We finished with  a dressing application. X-rays were reviewed as well. Grafting  hardware was well positioned throughout. We finished with a sewing of  the drain into the skin, apply the dressings, Aspen collar application,  and then would take the patient back to Recovery post extubation without  complication or difficulty. My first assistant was also Antonio Murphy PA-C, as well.     Antonio Murphy PA-C, assisted throughout the procedure with positioning,  draping, retraction, wound closure, dressing, and splint application.         Kim Juarez M.D.    D: 07/27/2020 10:29:03       T: 07/27/2020 13:54:42     [email or web address]  Job#: [id number]     Doc#: [id number]    CC:

## 2020-07-28 NOTE — PLAN OF CARE
Problem: Pain:  Goal: Pain level will decrease  Description: Pain level will decrease  7/28/2020 0626 by Sylvester Rivera RN  Outcome: Ongoing  Note: Pt report pain at 4 on scale. Pt states oral medication helping to achieve pain goal of a 3 on scale. 7/28/2020 0616 by Yovani Acuña RN  Outcome: Ongoing  Goal: Control of acute pain  Description: Control of acute pain  7/28/2020 0626 by Sylvester Rivera RN  Outcome: Ongoing  Note: Pt report pain at 4 on scale. Pt states oral medication helping to achieve pain goal of a 3 on scale. 7/28/2020 0616 by Yovani Acuña RN  Outcome: Ongoing  Goal: Control of chronic pain  Description: Control of chronic pain  7/28/2020 0626 by Sylvester Rivera RN  Outcome: Ongoing  Note: Pt report pain at 4 on scale. Pt states oral medication helping to achieve pain goal of a 3 on scale. 7/28/2020 0616 by Yovani Acuña RN  Outcome: Ongoing     Problem: Neurological  Goal: Maximum potential motor/sensory/cognitive function  7/28/2020 0626 by Sylvester Rivera RN  Outcome: Ongoing  Note: Pt alert and oriented x4  7/28/2020 0616 by Yovani Acuña RN  Outcome: Ongoing     Problem: Cardiovascular  Goal: No DVT, peripheral vascular complications  4/51/5582 0626 by Sylvester Rivera RN  Outcome: Ongoing  Note: Pt exhibits no s/s of DVT. Compliant with SCD's while in bed  7/28/2020 0616 by Yovani Acuña RN  Outcome: Ongoing     Problem: Respiratory  Goal: No pulmonary complications  7/11/7343 0626 by Sylvester Rivera RN  Outcome: Ongoing  Note: Sats greater than 90 on room air   7/28/2020 0616 by Yovani Acuña RN  Outcome: Ongoing     Problem: GI  Goal: No bowel complications  0/58/5582 0626 by Sylvester Rivera RN  Outcome: Ongoing  Note: Pt with bowel sounds, passing flatus, and without nausea.  Taking prescribed medications to assist with BM   7/28/2020 0616 by Yovani Acuña RN  Outcome: Ongoing     Problem:   Goal: Adequate urinary output  7/28/2020 0626 by Saud Holland Merly Gamboa RN  Outcome: Ongoing  Note: Pt voiding adequate amounts. Bladder scan showed 1000ml, straight cath done    7/28/2020 6022 by Yovani Acuña RN  Outcome: Ongoing     Problem: Skin Integrity/Risk  Goal: No skin breakdown during hospitalization  7/28/2020 3713 by Sylvester Rivera RN  Outcome: Ongoing  Note: Pt exhibits no s/s of new skin breakdown   7/28/2020 0616 by Yovani Acuña RN  Outcome: Ongoing     Problem: DAILY CARE  Goal: Daily care needs are met  7/28/2020 4625 by Sylvester Rivera RN  Outcome: Ongoing  Note: Pt needs are being met  7/28/2020 0616 by Yovani Acuña RN  Outcome: Ongoing     Problem: DISCHARGE BARRIERS  Goal: Patient's continuum of care needs are met  7/28/2020 0626 by Sylvester Rivera RN  Outcome: Ongoing  Note: Pt plans home at discharge. Care manager and social working helping with discharge needs. 7/28/2020 0616 by Yovani Acuña RN  Outcome: Ongoing   Care plan reviewed with patient. Patient verbalize understanding of the plan of care and contribute to goal setting.

## 2020-07-28 NOTE — FLOWSHEET NOTE
Pt was alone at the time of the visit. He was dealing with neck surgery. He wanted prayer and he was anointed     07/28/20 1523   Encounter Summary   Services provided to: Patient   Referral/Consult From: [facility]   Place of [address] [place] [address] Visiting Yes  (7/28 )   Complexity of Encounter Low   Length of Encounter 15 minutes   Routine   Type Initial   Assessment Approachable;Calm   Intervention Prayer;Nurtured hope;[place]   Outcome Acceptance;Expressed gratitude;Encouraged; Hopeful;Receptive   Sacraments   Sacrament of Sick-Anointing Anointed

## 2020-07-28 NOTE — CARE COORDINATION
Health for this service. Keisha Green is in agreement with [facility] and I called referral to [facility]. No other discharge needs voiced. Transportation/Food Security/Housekeeping Addressed:  No issues identified.  Evaluation: no     7/28/20, 9:50 AM EDT    Patient goals/plan/ treatment preferences discussed by  and . Patient goals/plan/ treatment preferences reviewed with patient/ family. Patient/ family verbalize understanding of discharge plan and are in agreement with goal/plan/treatment preferences. Understanding was demonstrated using the teach back method. AVS provided by RN at time of discharge, which includes all necessary medical information pertaining to the patients current course of illness, treatment, post-discharge goals of care, and treatment preferences.     Services After Discharge  Services At/After Discharge: Nursing Services   IMM Letter  IMM Letter given to Patient/Family/Significant other/Guardian/POA/by[de-identified] cm  IMM Letter date given[de-identified] 07/27/20  IMM Letter time given[de-identified] 1500

## 2020-07-28 NOTE — PROGRESS NOTES
[address] [address]  INPATIENT PHYSICAL THERAPY  EVALUATION  [facility] ORTHOPEDICS 7K - 7K-14/014-A    Time In: 7208  Time Out: 7855  Timed Code Treatment Minutes: 0 Minutes  Minutes: 11          Date: 2020  Patient Name: Billy Soliz,  Gender:  male        MRN: [id number]  : 1965  (47 y.o.)      Referring Practitioner: Joanna Lackey PA-C  Diagnosis: Spondylolisthesis of cervical region  Additional Pertinent Hx: Pt admitted  for c-spine sx C3 through C7 disectomy and fusion. Restrictions/Precautions:  Restrictions/Precautions: General Precautions  Required Braces or Orthoses  Cervical: snow ROGERS    Subjective:  Chart Reviewed: Yes  Patient assessed for rehabilitation services?: Yes  Family / Caregiver Present: No  Subjective: Pt walking in his room on his own when I walked in. Pt agreed to PT and ambualtion inhall    General:  Overall Orientation Status: Within Functional Limits  Follows Commands: Within Functional Limits    Vision: Within Functional Limits    Hearing: Exceptions to [facility]  Hearing Exceptions: Hard of hearing/hearing concerns         Pain: 3/10: neck and throat      Social/Functional History:    Lives With: Alone  Type of Home: House  Home Layout: One level  Home Access: Stairs to enter without rails  Entrance Stairs - Number of Steps: 3-4             ADL Assistance: Independent  Homemaking Assistance: Independent  Ambulation Assistance: Independent  Transfer Assistance: Independent    Active :  Yes     Additional Comments: No AD prior to admission    OBJECTIVE:  Range of Motion:  Bilateral Lower Extremity: WFL    Strength:  Bilateral Lower Extremity: WFL    Balance:  Static Standing Balance: Independent  Dynamic Standing Balance: Modified Independent    Bed Mobility:  Not Tested  Pt had gotten  up on his own prior to my arrival  Transfers:  Sit to Stand: Independent  Stand to Sit:Independent    Ambulation:  Modified Independent, Supervision  Distance: 300 feet  Surface: Level Tile  Device:No Device  Gait Deviations:  None    Stairs:  Supervision  Number of Steps: 4  Height: 6\" step with No Device        Functional Outcome Measures: Completed  AM-PAC Inpatient Mobility without Stair Climbing Raw Score : 20  AM-PAC Inpatient without Stair Climbing T-Scale Score : 60.57    ASSESSMENT:  Activity Tolerance:  Patient tolerance of  treatment: good. Treatment Initiated: Treatment and education initiated within context of evaluation. Evaluation time included review of current medical information, gathering information related to past medical, social and functional history, completion of standardized testing, formal and informal observation of tasks, assessment of data and development of plan of care and goals. Treatment time included skilled education and facilitation of tasks to increase safety and independence with functional mobility for improved independence and quality of life. Assessment: Body structures, Functions, Activity limitations: Decreased endurance  Assessment: Pt moving well,steady. No need for further PT. Pt can ambulate 3 x a day with staff  Prognosis: Excellent    REQUIRES PT FOLLOW UP: No  No Skilled PT: Safe to return home    Discharge Recommendations:  Discharge Recommendations: Home independently    Patient Education:  PT Education: PT Role, Goals, Transfer Training, Gait Training    Equipment Recommendations:  Equipment Needed: No    Plan:  Times per week: NA    Goals:  Patient goals : Go home  Short term goals  Time Frame for Short term goals: NA- pt moving steadily. No need for further therapy. Pt can ambulate with staff  Long term goals  Time Frame for Long term goals : NA    Following session, patient left in safe position with all fall risk precautions in place.

## 2020-07-28 NOTE — PROCEDURES
A Bladder scan was performed at 0255 . The residual amount was measured to be >999 ML. Report of results was given to [facility].

## 2020-07-28 NOTE — PROGRESS NOTES
[id number] discharge instructions reviewed and taught with patient. Patient denies any questions. Script given for percocet and flexeril. 1511 Patient discharged via wheelchair. Personal belongings sent with patient including home medications.

## 2020-08-18 ENCOUNTER — ANESTHESIA EVENT (OUTPATIENT)
Dept: ENDOSCOPY | Age: 55
End: 2020-08-18
Payer: MEDICARE

## 2020-08-18 ENCOUNTER — HOSPITAL ENCOUNTER (OUTPATIENT)
Age: 55
Setting detail: OUTPATIENT SURGERY
Discharge: HOME | End: 2020-08-18
Attending: INTERNAL MEDICINE | Admitting: INTERNAL MEDICINE
Payer: MEDICARE

## 2020-08-18 ENCOUNTER — ANESTHESIA (OUTPATIENT)
Dept: ENDOSCOPY | Age: 55
End: 2020-08-18
Payer: MEDICARE

## 2020-08-18 VITALS
SYSTOLIC BLOOD PRESSURE: 132 MMHG | OXYGEN SATURATION: 97 % | HEIGHT: 68 IN | HEART RATE: 81 BPM | TEMPERATURE: 96.7 F | DIASTOLIC BLOOD PRESSURE: 89 MMHG | WEIGHT: 156.6 LBS | BODY MASS INDEX: 23.73 KG/M2 | RESPIRATION RATE: 16 BRPM

## 2020-08-18 VITALS
RESPIRATION RATE: 16 BRPM | DIASTOLIC BLOOD PRESSURE: 74 MMHG | SYSTOLIC BLOOD PRESSURE: 113 MMHG | OXYGEN SATURATION: 99 %

## 2020-08-18 LAB — SARS-COV-2, NAAT: NOT DETECTED

## 2020-08-18 PROCEDURE — 3700000000 HC ANESTHESIA ATTENDED CARE: Performed by: INTERNAL MEDICINE

## 2020-08-18 PROCEDURE — 7100000000 HC PACU RECOVERY - FIRST 15 MIN: Performed by: INTERNAL MEDICINE

## 2020-08-18 PROCEDURE — 7100000001 HC PACU RECOVERY - ADDTL 15 MIN: Performed by: INTERNAL MEDICINE

## 2020-08-18 PROCEDURE — 2720000010 HC SURG SUPPLY STERILE: Performed by: INTERNAL MEDICINE

## 2020-08-18 PROCEDURE — 2580000003 HC RX 258: Performed by: INTERNAL MEDICINE

## 2020-08-18 PROCEDURE — 3700000001 HC ADD 15 MINUTES (ANESTHESIA): Performed by: INTERNAL MEDICINE

## 2020-08-18 PROCEDURE — 3609010600 HC COLONOSCOPY POLYPECTOMY SNARE/COLD BIOPSY: Performed by: INTERNAL MEDICINE

## 2020-08-18 PROCEDURE — 6360000002 HC RX W HCPCS: Performed by: NURSE ANESTHETIST, CERTIFIED REGISTERED

## 2020-08-18 PROCEDURE — U0002 COVID-19 LAB TEST NON-CDC: HCPCS

## 2020-08-18 PROCEDURE — 2709999900 HC NON-CHARGEABLE SUPPLY: Performed by: INTERNAL MEDICINE

## 2020-08-18 PROCEDURE — 88305 TISSUE EXAM BY PATHOLOGIST: CPT

## 2020-08-18 RX ORDER — CYANOCOBALAMIN (VITAMIN B-12) 500 MCG
1 TABLET ORAL DAILY
COMMUNITY

## 2020-08-18 RX ORDER — PROPOFOL 10 MG/ML
INJECTION, EMULSION INTRAVENOUS PRN
Status: DISCONTINUED | OUTPATIENT
Start: 2020-08-18 | End: 2020-08-18 | Stop reason: SDUPTHER

## 2020-08-18 RX ORDER — SODIUM CHLORIDE 450 MG/100ML
INJECTION, SOLUTION INTRAVENOUS CONTINUOUS
Status: DISCONTINUED | OUTPATIENT
Start: 2020-08-18 | End: 2020-08-18 | Stop reason: HOSPADM

## 2020-08-18 RX ADMIN — SODIUM CHLORIDE: 4.5 INJECTION, SOLUTION INTRAVENOUS at 13:02

## 2020-08-18 RX ADMIN — PROPOFOL 150 MG: 10 INJECTION, EMULSION INTRAVENOUS at 14:35

## 2020-08-18 ASSESSMENT — COPD QUESTIONNAIRES: CAT_SEVERITY: NO INTERVAL CHANGE

## 2020-08-18 ASSESSMENT — PAIN SCALES - GENERAL
PAINLEVEL_OUTOF10: 0
PAINLEVEL_OUTOF10: 0

## 2020-08-18 ASSESSMENT — PAIN - FUNCTIONAL ASSESSMENT: PAIN_FUNCTIONAL_ASSESSMENT: 0-10

## 2020-08-18 NOTE — ANESTHESIA PRE PROCEDURE
Department of Anesthesiology  Preprocedure Note       Name:  Shantell Landry   Age:  47 y.o.  :  1965                                          MRN:  [id number]         Date:  2020      Surgeon: Bailee Carbajal):  Sabine Mendiola MD    Procedure: Procedure(s):  COLONOSCOPY    Medications prior to admission:   Prior to Admission medications    Medication Sig Start Date End Date Taking?  Authorizing Provider   Cyanocobalamin (VITAMIN B 12) 500 MCG TABS Take 1 tablet by mouth daily   Yes Historical Provider, MD   Omega-3 Fatty Acids (FISH OIL) 1200 MG CPDR Take by mouth daily Omega 3   Yes Historical Provider, MD   acetaminophen (TYLENOL) 500 MG tablet Take 1,000 mg by mouth daily   Yes Historical Provider, MD       Current medications:    Current Facility-Administered Medications   Medication Dose Route Frequency Provider Last Rate Last Dose    0.45 % sodium chloride infusion   Intravenous Continuous Sabine Mendiola MD 75 mL/hr at 20 1302         Allergies:  No Known Allergies    Problem List:    Patient Active Problem List   Diagnosis Code    Spondylolisthesis of cervical region M43.12       Past Medical History:        Diagnosis Date    Arthritis     [facility]     Hepatitis     Recovering alcoholic ([facility]     Scoliosis        Past Surgical History:        Procedure Laterality Date    CERVICAL FUSION N/A 2020    ACDF C3-C7 WITH MEDTRONIC performed by Rolando Galindo MD at [address] [place] [address] HAND REPLANTATION      laceration repair at 25years of age   [facility] TONSILLECTOMY         Social History:    Social History     Tobacco Use    Smoking status: Former Smoker     Packs/day: 1.00     Types: Cigarettes     Last attempt to quit: 2020     Years since quittin.1    Smokeless tobacco: Never Used   Substance Use Topics    Alcohol use: No     Comment: quit 6 1/2 years ago                                Counseling given: Not Answered      Vital Signs (Current):   Vitals:    08/18/20 1245   BP: 136/89   Pulse: 83   Resp: 18   Temp: 36.5 °C (97.7 °F)   TempSrc: Temporal   SpO2: 97%   Weight: 156 lb 9.6 oz (71 kg)   Height: 5' 8\" (1.727 m)                                              BP Readings from Last 3 Encounters:   08/18/20 136/89   07/28/20 109/83   07/27/20 116/69       NPO Status: Time of last liquid consumption: 0945(COFFEE)                        Time of last solid consumption: 1800                        Date of last liquid consumption: 08/18/20                        Date of last solid food consumption: 08/16/20    BMI:   Wt Readings from Last 3 Encounters:   08/18/20 156 lb 9.6 oz (71 kg)   07/27/20 155 lb 6.4 oz (70.5 kg)   07/21/20 150 lb (68 kg)     Body mass index is 23.81 kg/m². CBC: No results found for: WBC, RBC, HGB, HCT, MCV, RDW, PLT    CMP: No results found for: NA, K, CL, CO2, BUN, CREATININE, GFRAA, AGRATIO, LABGLOM, GLUCOSE, PROT, CALCIUM, BILITOT, ALKPHOS, AST, ALT    POC Tests: No results for input(s): POCGLU, POCNA, POCK, POCCL, POCBUN, POCHEMO, POCHCT in the last 72 hours.     Coags: No results found for: PROTIME, INR, APTT    HCG (If Applicable): No results found for: PREGTESTUR, PREGSERUM, HCG, HCGQUANT     ABGs: No results found for: PHART, PO2ART, WCO2RLX, OOQ3ZNZ, BEART, G4KHCNVB     Type & Screen (If Applicable):  Lab Results   Component Value Date    LABRH POS 07/27/2020       Drug/Infectious Status (If Applicable):  No results found for: HIV, HEPCAB    COVID-19 Screening (If Applicable):   Lab Results   Component Value Date    COVID19 NOT DETECTED 08/18/2020    COVID19 Not Detected 07/21/2020         Anesthesia Evaluation  Patient summary reviewed and Nursing notes reviewed no history of anesthetic complications:   Airway: Mallampati: II  TM distance: >3 FB   Neck ROM: full  Mouth opening: > = 3 FB Dental:    (+) upper dentures and lower dentures      Pulmonary:normal exam    (+) COPD: no interval change,                             Cardiovascular:  Exercise tolerance: good (>4 METS),                     Neuro/Psych:   (+) psychiatric history:            GI/Hepatic/Renal:   (+) liver disease:,           Endo/Other: Negative Endo/Other ROS   (+) : arthritis: OA., .                 Abdominal:           Vascular:                                        Anesthesia Plan      MAC     ASA 2             Anesthetic plan and risks discussed with patient. Plan discussed with attending.                   MORIS Bledsoe - DANNY   8/18/2020

## 2020-08-18 NOTE — ANESTHESIA POSTPROCEDURE EVALUATION
Department of Anesthesiology  Postprocedure Note    Patient: Jeremie Reynoso  MRN: [id number]  YOB: 1965  Date of evaluation: 8/18/2020  Time:  2:52 PM     Procedure Summary     Date:  08/18/20 Room / Location:  [address] [place] [address] Austinjohn Dolton / Cait Joel    Anesthesia Start:  7179 Anesthesia Stop:  5224    Procedure:  COLONOSCOPY POLYPECTOMY SNARE/COLD BIOPSY (Left Anus) Diagnosis:  (H/O COLON POLYPS)    Surgeon:  Sheyla Robles MD Responsible Provider:  Cindy Herron DO    Anesthesia Type:  MAC ASA Status:  2          Anesthesia Type: MAC    Robin Phase I: Robin Score: 10    Robin Phase II:      Last vitals: Reviewed and per EMR flowsheets.        Anesthesia Post Evaluation    Patient location during evaluation: bedside  Patient participation: complete - patient participated  Level of consciousness: awake  Pain score: 0  Airway patency: patent  Nausea & Vomiting: no nausea and no vomiting  Complications: no  Cardiovascular status: hemodynamically stable  Respiratory status: acceptable  Hydration status: euvolemic

## 2020-08-18 NOTE — POST SEDATION
[address] [address]  Sedation/Analgesia Post Sedation Record    Patient: Angelia Mckay : 1965  Med Rec#: [id number] Acc#: [id number]   Procedure Performed By: Mihir Cotto  Primary Care Physician: Emily Whitfield MD    POST-PROCEDURE    Physicians/Assistants: Mihir Cotto  Procedure Performed:    Sedation/Anesthesia:     Estimated Blood Loss:          ml  Specimens Removed:  []None [x]Other:      Disposition of Specimen:  [x]Pathology []Other      Complications:   [x]None Immediate []Other:     Post-procedure Diagnosis/Findings:             Recommendations:      polyp         Mihir Cotto MD   Electronically signed 2020 at 2:53 PM

## 2020-08-19 NOTE — OP NOTE
[address] [place] [address]                                OPERATIVE REPORT    PATIENT NAME: Brendan Morrell                   :        1965  MED REC NO:   [id number]                           ROOM:  ACCOUNT NO:   [de-identified]                           ADMIT DATE: 2020  PROVIDER:     Rick Conn M.D.    Phyliss Pack OF PROCEDURE:  2020    PROCEDURE:  Colonoscopy plus snare polypectomy. SURGEON:  Rick Conn MD    INDICATION FOR THE PROCEDURE:  The patient is here for high-risk  screening colon adenomas removed in the past.  See the list of  medications. See the preop note for rest of clinicals. ASA CLASSIFICATION:  II.    MEDICATION:  Per Anesthesia. BIOPSY:  Yes. PHOTOGRAPHS:  Yes. BLOOD LOSS:  None. DESCRIPTION OF PROCEDURE:  Informed consent was obtained after  explaining risks and benefits of the procedure and conscious sedation. Possible complications including bleeding, perforation, reaction to  medicine, but not limiting to death, were discussed. Afterwards, CFQ-180 colonoscope was advanced under direct visualization  to the cecum. Landmarks were identified. No polyp or mass in the  cecum. Ascending colon had a polyp, removed with cold snare. Transverse colon was normal.  Descending colon had a polyp, removed with  cold snare. Sigmoid had a polyp, removed with cold snare. Internal  hemorrhoids were seen. Tolerated the procedure well. IMPRESSION:  1.  Multiple colon polyps, total of three, suspicious for serrated  adenoma, post snare polypectomy. 2.  Previous history of adenomas. RECOMMENDATION:  Followup colonoscopy in three years.         Christina Doll M.D.    D: 2020 15:04:23       T: 2020 15:44:54     [email or web address]  Job#: [id number]     Doc#: [id number]    CC:  Family  Physician

## 2020-08-27 NOTE — DISCHARGE SUMMARY
Orthopedic Spine Discharge Summary      Patient Identification:   Teena Rosales   : 1965  MRN: [id number]   Account: [de-identified]      Patient's PCP: David Jung MD    Admit Date: 2020     Discharge Date: 2020    Admitting Physician: Emilio Jones MD     Discharge Provider: Sarah Lawrence PA-C , Dr. Zee Matthews    Discharge Diagnoses:  1. Multilevel cervical spondylosis and stenosis with a resultant      radiculopathy and cervical myelopathy. 2.  Grade 1 spondylolisthesis of C3-C4. 3.  Severe degenerative disk disease of levels of C5-C6 and C6-C7. The patient was seen and examined on day of discharge and this discharge summary is in conjunction with any daily progress note from day of discharge. Operation Performed:  1. Anterior cervical diskectomies of levels of C3-C4, C4-C5, C5-C6, and      C6-C7.  2.  Anterior cervical interbody fusion of levels of C3-C4, C4-C5, C5-C6,      and C6-C7. 3.  Use of structural bone graft Medtronic Voxietone bone bank bone      for these four levels of interbody fusion. 4.  Use of the allograft bone [employer] [place] for these four levels of      interbody fusion. 5.  Plating of cervical spine with use of an 80-mm Medtronic Atlantis      Elite plate attached to bone with a total of 10 screws from levels of C3      through C7. Hospital Course: The patient is a 47 y.o. male, who presented to our office having symptoms of cervical pain, bilateral trapezial pain, and right upper extremity radiculopathy. He also complained of an increased frequency of dropping of objects and loss of upper extremity strength of his right upper extremity. Due to failed outpatient conservative therapies, the patient elected to undergo operative management on the date of 2020 at [address] [address], after which he was admitted to the [facility] floor for continued monitoring and care.     On POD 1, Jose complained of a mild sore throat but as tolerating oral liquid and food intake without significant difficulty. He had relief of his right upper extremity pain and significant improvement of his right upper extremity strength. Through the night nad early morning hours, he was having difficulty urinating in which required a straight catheter. He noted a past history of difficulty voiding in the past also. In the early afternoon, he was able to void without difficulty and was discharged home with drain intact and home health. Throughout the hospital stay, strength was maintained 5/5 with , wrist extension, finger flexion/extension, elbow flexion/extension, and shoulder abduction bilaterally. Patient was eating and drinking without much difficulty. Upon discharge, the drain was intact and patient was sent home with home health for wound and drain management. Consults:     IP CONSULT TO CASE MANAGEMENT  IP CONSULT TO HOME CARE NEEDS    Disposition:  home    Condition at Discharge:  Stable    Discharge Medications:   1. Flexeril  2. Percocet          Discharge Instructions:  Continue to use cervical collar for two weeks until seen back in the office. No heavy lifting or strenuous activities. Steri strips can be removed in 7 days. Ok to resume blood thinners 24-48 hours after drain removal. No driving until post op office visit in about two weeks. At that time, we will evaluate the patient with cervical spine x-rays and clinically evaluate recovery progress.         Electronically signed by Husam Chambers PA-C on 8/26/2020

## 2020-09-19 ENCOUNTER — APPOINTMENT (OUTPATIENT)
Dept: GENERAL RADIOLOGY | Age: 55
End: 2020-09-19
Payer: MEDICARE

## 2020-09-19 ENCOUNTER — HOSPITAL ENCOUNTER (EMERGENCY)
Age: 55
Discharge: HOME | End: 2020-09-19
Payer: MEDICARE

## 2020-09-19 VITALS
BODY MASS INDEX: 22.96 KG/M2 | WEIGHT: 155 LBS | OXYGEN SATURATION: 98 % | RESPIRATION RATE: 16 BRPM | DIASTOLIC BLOOD PRESSURE: 92 MMHG | HEIGHT: 69 IN | HEART RATE: 79 BPM | TEMPERATURE: 98.5 F | SYSTOLIC BLOOD PRESSURE: 137 MMHG

## 2020-09-19 PROCEDURE — 99213 OFFICE O/P EST LOW 20 MIN: CPT

## 2020-09-19 PROCEDURE — 73130 X-RAY EXAM OF HAND: CPT

## 2020-09-19 PROCEDURE — 99203 OFFICE O/P NEW LOW 30 MIN: CPT | Performed by: NURSE PRACTITIONER

## 2020-09-19 PROCEDURE — 29125 APPL SHORT ARM SPLINT STATIC: CPT

## 2020-09-19 RX ORDER — HYDROCODONE BITARTRATE AND ACETAMINOPHEN 5; 325 MG/1; MG/1
1 TABLET ORAL EVERY 6 HOURS PRN
Qty: 12 TABLET | Refills: 0 | Status: SHIPPED | OUTPATIENT
Start: 2020-09-19 | End: 2020-09-22

## 2020-09-19 ASSESSMENT — PAIN SCALES - GENERAL: PAINLEVEL_OUTOF10: 5

## 2020-09-19 ASSESSMENT — PAIN DESCRIPTION - LOCATION: LOCATION: HAND

## 2020-09-19 ASSESSMENT — PAIN DESCRIPTION - ORIENTATION: ORIENTATION: LEFT

## 2020-09-19 NOTE — ED NOTES
During routine triage questions when pt was asked about drug and alcohol use he becomes upset and ask \" what does all this have to do with my hand? \" Informed that these were routine questions every patient gets asked on thomas Cárdenas RN  09/19/20 4958

## 2020-09-19 NOTE — ED PROVIDER NOTES
[address]       Chief Complaint   Patient presents with    Hand Pain     left smashed with a approx 50 pound cylinder while helping a friend       Nurses Notes reviewed and I agree except as noted in the HPI. HISTORY OF PRESENT ILLNESS   Brendan Howell is a 47 y.o. male who presents to the urgent care for evaluation  of his left hand. He is experiencing left hand pain that started around noon while helping a friend move a cylinder, weighing approximately 50 lbs. The cylinder slipped and dropped onto his hand smashing his left hand. The pain is constant, sharp, throbbing, he rates it a 5/10. Negative for swelling, bruising, numbness, tingling, weakness. There is no abrasions or oen lacerations. There is limited ROM to the left hand due to pain. Having difficulty making a full fist due to pain. No otc meds taken prior to arrival.         REVIEW OF SYSTEMS     Review of Systems   Constitutional: Negative for activity change, appetite change, chills, diaphoresis, fatigue and fever. Musculoskeletal: Positive for arthralgias. Negative for back pain, gait problem, joint swelling and neck pain. Skin: Negative for color change, pallor, rash and wound. Neurological: Negative for weakness and numbness. Hematological: Negative for adenopathy. Does not bruise/bleed easily. PAST MEDICAL HISTORY         Diagnosis Date    Arthritis     [facility] [address]     Hepatitis     Recovering alcoholic ([facility] [address]     Scoliosis        SURGICAL HISTORY     Patient  has a past surgical history that includes Dental surgery; Hand replantation; cervical fusion (N/A, 7/27/2020); Tonsillectomy; and Colonoscopy (Left, 8/18/2020).     CURRENT MEDICATIONS       Discharge Medication List as of 9/19/2020  6:59 PM      CONTINUE these medications which have NOT CHANGED    Details   Cyanocobalamin (VITAMIN B 12) 500 MCG TABS Take 1 tablet by mouth dailyHistorical Med      Omega-3 Fatty Acids (FISH OIL) 1200 MG CPDR Take by mouth daily Omega 3Historical Med      acetaminophen (TYLENOL) 500 MG tablet Take 1,000 mg by mouth dailyHistorical Med             ALLERGIES     Patient is has No Known Allergies. FAMILY HISTORY     Patient'sfamily history includes Heart Attack in his father and mother. SOCIAL HISTORY     Patient  reports that he quit smoking about 2 months ago. His smoking use included cigarettes. He smoked 1.00 pack per day. He has never used smokeless tobacco. He reports current drug use. Drug: Marijuana. He reports that he does not drink alcohol. PHYSICAL EXAM     ED TRIAGE VITALS  BP: (!) 137/92, Temp: 98.5 °F (36.9 °C), Pulse: 79, Resp: 16, SpO2: 98 %  Physical Exam  Vitals signs and nursing note reviewed. Constitutional:       General: He is not in acute distress. Appearance: Normal appearance. He is well-developed and well-groomed. He is not ill-appearing, toxic-appearing or diaphoretic. HENT:      Head: Normocephalic and atraumatic. Right Ear: Hearing and external ear normal.      Left Ear: Hearing and external ear normal.      Mouth/Throat:      Lips: Pink. Eyes:      General: Lids are normal.      Conjunctiva/sclera: Conjunctivae normal.      Right eye: Right conjunctiva is not injected. Left eye: Left conjunctiva is not injected. Pupils: Pupils are equal.   Neck:      Musculoskeletal: Normal range of motion and neck supple. Cardiovascular:      Rate and Rhythm: Normal rate and regular rhythm. Pulses: Normal pulses. Radial pulses are 2+ on the left side. Heart sounds: No murmur. Pulmonary:      Effort: Pulmonary effort is normal. No respiratory distress. Breath sounds: Normal breath sounds and air entry. No stridor, decreased air movement or transmitted upper airway sounds. No decreased breath sounds, wheezing, rhonchi or rales.    Musculoskeletal:      Left wrist: Normal.      Right knee: He exhibits normal range of motion. Left knee: He exhibits normal range of motion. Left hand: He exhibits decreased range of motion, tenderness (mild soft tissue swelling over third digit), bony tenderness (proximal base of proximal phalange left 3rd digit. Tenderness over third metarcarpal . ) and swelling. He exhibits normal two-point discrimination, normal capillary refill, no deformity and no laceration. Normal sensation noted. Decreased sensation is not present in the ulnar distribution, is not present in the medial redistribution and is not present in the radial distribution. Normal strength noted. He exhibits no finger abduction, no thumb/finger opposition and no wrist extension trouble. Skin:     General: Skin is warm and dry. Capillary Refill: Capillary refill takes less than 2 seconds. Coloration: Skin is not cyanotic, jaundiced or pale. Findings: No abrasion, bruising, ecchymosis, erythema, laceration, lesion, petechiae, rash or wound. Comments: No obvious signs of infection or trauma. Neurological:      Mental Status: He is alert and oriented to person, place, and time. Sensory: Sensation is intact. No sensory deficit. Motor: No weakness. Psychiatric:         Behavior: Behavior normal. Behavior is cooperative. DIAGNOSTIC RESULTS   Labs:  Abnormal Labs Reviewed - No data to display     IMAGING:  XR HAND LEFT (MIN 3 VIEWS)   Final Result      1. There is cortical regularity demonstrated at the medial proximal base of the proximal phalange of the left third digit consistent with a small chip fracture. This may extend into the left third metacarpal phalangeal joint. 2. There is also small fracture fragment demonstrated along the medial aspect of the distal head of the left third metacarpal bone which also likely represents a chip fracture fragment. **This report has been created using voice recognition software. It may contain minor errors which are inherent in voice recognition technology. **      Final report electronically signed by Dr. Sindy Segundo on 9/19/2020 5:50 PM        URGENT CARE COURSE:     Vitals:    09/19/20 1719   BP: (!) 137/92   Pulse: 79   Resp: 16   Temp: 98.5 °F (36.9 °C)   SpO2: 98%   Weight: 155 lb (70.3 kg)   Height: 5' 9\" (1.753 m)       Medications - No data to display  PROCEDURES:  A volar splint was placed to the left hand  There was no neurovascular compromise after splint application; the splint was in good alignment and the patient had good sensation and capillary refill at the time of discharge. FINALIMPRESSION      1. Left hand fracture, closed, initial encounter    2. Closed nondisplaced fracture of proximal phalanx of left middle finger, initial encounter        DISPOSITION/PLAN   DISPOSITION Decision To Discharge 09/19/2020 06:53:55 PM  RICE method discussed  Splint care discussed  Xray of left hand was reviewed during encounter: I personally reviewed the xray with patient and agree with the radiologist findings of : There is a cortical regularity demonstrated at the medial proximal base of the proximal phalange of the left third digit consistent with a small chip fracture. This may extend into the left third metacarpal phalangeal joint. There is also a small fracture fragment demonstrated along the medial aspect of the distal head of the left third metacarpal bone which also likely represents a chip fracture fragment. fu with pcp in 3 days     Orthopedics within 1-3 days    Physical assessment findings, diagnostic testing(s) if applicable, and vital signs reviewed with patient/patient representative. Questions answered. If applicable, patient/patient representative will be contacted upon receipt of final culture and sensitivity or other testing results when available.   Any additions or changes to medications or changes the plan of care will be made at that time.  Medications as directed, including OTC medications for supportive care. Education provided on medications. Differential diagnosis(s) discussed with patient/patient representative. Home care/self care instructions reviewed with patient/patient representative. Patient is to follow-up with family care provider in 2-3 days if no improvement. Patient is to go to the emergency department if symptoms worsen. Patient/patient representative is aware of care plan, questions answered, verbalizes understanding and is in agreement. Teach back method used for patient/patient representative teaching(s) and printed instructions attached to after visit summary.       Problem List Items Addressed This Visit     None      Visit Diagnoses     Left hand fracture, closed, initial encounter    -  Primary    Closed nondisplaced fracture of proximal phalanx of left middle finger, initial encounter              PATIENT REFERRED TO:  MD Vasyl Armenta[address]  [phone]    Go in 3 days  For appointment , As needed      Anabel , APRN - [address] [facility], APRN [facility]  09/28/20 [address] [place], [place] [address]N [facility]  09/28/20 [address] [place], [place] [address]N [facility]  09/28/20 2429

## 2020-09-25 ASSESSMENT — ENCOUNTER SYMPTOMS
BACK PAIN: 0
COLOR CHANGE: 0

## 2020-10-02 ENCOUNTER — HOSPITAL ENCOUNTER (OUTPATIENT)
Dept: MRI IMAGING | Age: 55
Discharge: HOME | End: 2020-10-02
Payer: MEDICARE

## 2020-10-02 ENCOUNTER — HOSPITAL ENCOUNTER (OUTPATIENT)
Dept: NEUROLOGY | Age: 55
Discharge: HOME | End: 2020-10-02
Payer: MEDICARE

## 2020-10-02 PROCEDURE — 70551 MRI BRAIN STEM W/O DYE: CPT

## 2020-10-02 PROCEDURE — 95819 EEG AWAKE AND ASLEEP: CPT | Performed by: PSYCHIATRY & NEUROLOGY

## 2020-10-02 PROCEDURE — 95819 EEG AWAKE AND ASLEEP: CPT

## 2020-10-02 NOTE — PROGRESS NOTES
[facility] Laboratory Technician worksheet       EEG Date: 10/2/2020    Name: Phylis Klinefelter   : 1965   Age: 47 y.o. SEX: male    Room: OP    MRN: [id number]     CSN: [id number]    Ordering Provider: Keri Benites  EEG Number: [id number] Time of Test:  9215    Hand: Right   Sedation: No    H.V. Done: No NOT DONE Photic: Yes    Sleep: No   Drowsy: Yes   Sleep Deprived: No    Seizures observed: no    Mentality: alert       Clinical History: PATIENT STATED HE HAS MEMORY PROBLEMS SINCE A CONCUSSION IN 2018    Past Medical History:       Diagnosis Date    Arthritis     [facility] [address]     Hepatitis     Recovering alcoholic ([facility] [address]     Scoliosis          Prior to Admission medications    Medication Sig Start Date End Date Taking?  Authorizing Provider   Cyanocobalamin (VITAMIN B 12) 500 MCG TABS Take 1 tablet by mouth daily    Historical Provider, MD   Omega-3 Fatty Acids (FISH OIL) 1200 MG CPDR Take by mouth daily [place] 3    Historical Provider, MD   acetaminophen (TYLENOL) 500 MG tablet Take 1,000 mg by mouth daily    Historical MD Sangeeta       Technician: Lv Danielle 10/2/2020

## 2020-10-03 NOTE — PROCEDURES
[address] [id number]                          ELECTROENCEPHALOGRAM REPORT    PATIENT NAME: Jose Luis Arambula                   :        1965  MED REC NO:   [id number]                           ROOM:  ACCOUNT NO:   [de-identified]                           ADMIT DATE: 10/02/2020  PROVIDER:     Anahi Poe. Nikia Woodard MD    DATE OF EEG:  10/02/2020    REFERRING PROVIDER:  Connie Downs MD    CLINICAL HISTORY:  A 59-year-old male presenting with memory problems  since concussion of , evaluate for seizure. CLINICAL INTERPRETATION:  This is a 17-channel EEG performed without  sleep deprivation. Hyperventilation was not performed. Photic  stimulation was performed. The patient was described as alert. Medications listed are vitamin B12, omega-3, fish oil. The background rhythm activity was noted to be 8-9 Hz in the posterior  parietal area, symmetric, well modulated, attenuates with eye opening. The patient noted to be drowsy during parts of recording. Hyperventilation was not performed. Photic stimulation was performed  with driving seen through some of the frequencies. Light stages of  sleep were seen during the recording. EKG tracing revealed regular  heart rate. IMPRESSION:  This is a normal EEG. There was no evidence of  epileptiform activity appreciated.         Brianna Donis MD    D: 10/02/2020 23:24:08       T: 10/02/2020 23:33:43     CARLOS [email or web address]  Job#: [id number]     Doc#: [id number]    CC:

## 2021-01-21 ENCOUNTER — OFFICE VISIT (OUTPATIENT)
Dept: PHYSICAL MEDICINE AND REHAB | Age: 56
End: 2021-01-21
Payer: MEDICARE

## 2021-01-21 ENCOUNTER — TELEPHONE (OUTPATIENT)
Dept: PHYSICAL MEDICINE AND REHAB | Age: 56
End: 2021-01-21

## 2021-01-21 VITALS
TEMPERATURE: 97.5 F | SYSTOLIC BLOOD PRESSURE: 130 MMHG | HEART RATE: 80 BPM | HEIGHT: 69 IN | BODY MASS INDEX: 23.11 KG/M2 | WEIGHT: 156 LBS | DIASTOLIC BLOOD PRESSURE: 80 MMHG

## 2021-01-21 DIAGNOSIS — M41.126 ADOLESCENT IDIOPATHIC SCOLIOSIS OF LUMBAR REGION: ICD-10-CM

## 2021-01-21 DIAGNOSIS — M47.816 LUMBAR SPONDYLOSIS: Primary | ICD-10-CM

## 2021-01-21 DIAGNOSIS — M47.816 LUMBAR FACET ARTHROPATHY: ICD-10-CM

## 2021-01-21 DIAGNOSIS — G89.4 CHRONIC PAIN SYNDROME: ICD-10-CM

## 2021-01-21 DIAGNOSIS — M43.16 SPONDYLOLISTHESIS OF LUMBAR REGION: ICD-10-CM

## 2021-01-21 PROCEDURE — 99205 OFFICE O/P NEW HI 60 MIN: CPT | Performed by: PAIN MEDICINE

## 2021-01-21 PROCEDURE — G8427 DOCREV CUR MEDS BY ELIG CLIN: HCPCS | Performed by: PAIN MEDICINE

## 2021-01-21 PROCEDURE — G8420 CALC BMI NORM PARAMETERS: HCPCS | Performed by: PAIN MEDICINE

## 2021-01-21 PROCEDURE — G8484 FLU IMMUNIZE NO ADMIN: HCPCS | Performed by: PAIN MEDICINE

## 2021-01-21 PROCEDURE — 1036F TOBACCO NON-USER: CPT | Performed by: PAIN MEDICINE

## 2021-01-21 PROCEDURE — 3017F COLORECTAL CA SCREEN DOC REV: CPT | Performed by: PAIN MEDICINE

## 2021-01-21 RX ORDER — KETOROLAC TROMETHAMINE 10 MG/1
10 TABLET, FILM COATED ORAL EVERY 6 HOURS PRN
COMMUNITY

## 2021-01-21 RX ORDER — TRAMADOL HYDROCHLORIDE 50 MG/1
50 TABLET ORAL EVERY 6 HOURS PRN
Qty: 60 TABLET | Refills: 0 | Status: SHIPPED | OUTPATIENT
Start: 2021-01-22 | End: 2021-02-06

## 2021-01-21 RX ORDER — METHYLPREDNISOLONE 4 MG/1
4 TABLET ORAL SEE ADMIN INSTRUCTIONS
COMMUNITY

## 2021-01-21 RX ORDER — TRAMADOL HYDROCHLORIDE 50 MG/1
50 TABLET ORAL EVERY 6 HOURS PRN
COMMUNITY

## 2021-01-21 ASSESSMENT — ENCOUNTER SYMPTOMS
BOWEL INCONTINENCE: 0
VOMITING: 0
WHEEZING: 0
EYE PAIN: 0
CHEST TIGHTNESS: 0
PHOTOPHOBIA: 0
RHINORRHEA: 0
CONSTIPATION: 0
COUGH: 0
SORE THROAT: 0
ABDOMINAL PAIN: 0
COLOR CHANGE: 0
SHORTNESS OF BREATH: 0
SINUS PRESSURE: 0
DIARRHEA: 0
NAUSEA: 0
BACK PAIN: 1

## 2021-01-21 NOTE — PROGRESS NOTES
[address]  Dept: [phone]  Dept Fax: [phone]: [phone]    Visit Date: 1/21/2021    Eloise Randle is a 54 y.o. male who is referred for pain management evaluation and treatment per Dr. Capo Chambers. CAGE and CAGE-AID Questions   1. In the last three months, have you felt you should cut down or stop drinking or using drugs? Yes []        No [x]     2. In the last three months, has anyone annoyed you or gotten on your nerves by telling you to cut down or stop drinking or using drugs? Yes []        No [x]     3. In the last three months, have you felt guilty or bad about how much you drink or use drugs? Yes []        No [x]     4. In the last three months, have you been waking up wanting to have an alcoholic drink or use drugs? Yes []        No [x]        Opioid Risk Tool:  Clinician Form       1. Family History of Substance Abuse: Female Male    Alcohol   []1   []3    Illegal drugs   []2   []3    Prescription drugs     []4   []4   2. Personal History of Substance Abuse:          Alcohol   []3   [x]3    Illegal drugs   []4   []4    Prescription drugs     []5   []5   3. Age (deloris box if between 12 and 39):     []1   []1   4. History of Preadolescent Sexual Abuse:     []3   []0   5. Psychological Disease:      Attention deficit disorder, obsessive-compulsive disorder, bipolar, schizophrenia   []2   []2      Depression     []1   [x]1    Scoring Totals 0 4     Total Score  Low Risk  Moderate Risk  High Risk   Risk Category   0 - 3   4 - 7   8 or Above      Patient states symptoms interfere with:  A.  General Activity:  yes   B. Mood: yes    C. Walking Ability:   yes   D. Normal Work (Includes both work outside the home and housework):   yes    E.  Relations with Other People:  yes   F. Sleep:   yes   G.  Enjoyment of Life:  yes       HPI: ChiefComplaint: Low back pain    Back Pain  Chronicity: Patient is a 53 y/o male with long history of lumbar pain. Helen Manuel denies any prior lumbar surgeries or injuries. The problem occurs constantly. The problem has been gradually worsening since onset. The pain is present in the lumbar spine. The quality of the pain is described as aching, stabbing and shooting. The pain does not radiate. Pain scale: Pain scale at worse is a 8-9/10 and at best is a 2-3/10. The pain is the same all the time. The symptoms are aggravated by bending, position, standing and twisting (walking and getting up from chair. States pain better with resting and laying down. ). Pertinent negatives include no abdominal pain, bladder incontinence, bowel incontinence, chest pain, fever, headaches, numbness, tingling or weakness. He has tried chiropractic manipulation for the symptoms. MRI LUMBAR SPINE 01/05/2021:              The patient has No Known Allergies. Rejeana Baumgarten  has a past medical history of Arthritis, COPD (chronic obstructive pulmonary disease) ([address] Hepatitis, Recovering alcoholic ([address] and Scoliosis. Past Surgical History  The patient  has a past surgical history that includes Dental surgery; Hand replantation; cervical fusion (N/A, 7/27/2020); Tonsillectomy; and Colonoscopy (Left, 8/18/2020). Family History  This patient's family history includes Heart Attack in his father and mother. Social History  Leopoldo Falconer  reports that he quit smoking about 6 months ago. His smoking use included cigarettes. He smoked 1.00 pack per day. He has never used smokeless tobacco. He reports current drug use. Drug: Marijuana. He reports that he does not drink alcohol. Medications    Current Outpatient Medications:     traMADol (ULTRAM) 50 MG tablet, Take 50 mg by mouth every 6 hours as needed. , Disp: , Rfl:     ketorolac (TORADOL) 10 MG tablet, Take 10 mg by mouth every 6 hours as needed For 2 days, Disp: , Rfl:     methylPREDNISolone (MEDROL DOSEPACK) 4 MG tablet, Take 4 mg by mouth See Admin Instructions Take by mouth., Disp: , Rfl:     [START ON 1/22/2021] traMADol (ULTRAM) 50 MG tablet, Take 1 tablet by mouth every 6 hours as needed for Pain for up to 15 days. Take lowest dose possible to manage pain, Disp: 60 tablet, Rfl: 0    Cyanocobalamin (VITAMIN B 12) 500 MCG TABS, Take 1 tablet by mouth daily, Disp: , Rfl:     Omega-3 Fatty Acids (FISH OIL) 1200 MG CPDR, Take by mouth daily Omega 3, Disp: , Rfl:     acetaminophen (TYLENOL) 500 MG tablet, Take 1,000 mg by mouth daily, Disp: , Rfl:     Subjective:      Review of Systems   Constitutional: Positive for activity change. Negative for appetite change, chills, diaphoresis, fatigue, fever and unexpected weight change. HENT: Positive for hearing loss. Negative for congestion, ear pain, mouth sores, nosebleeds, rhinorrhea, sinus pressure and sore throat. HEARING AIDS   Eyes: Negative for photophobia, pain and visual disturbance. Respiratory: Negative for cough, chest tightness, shortness of breath and wheezing. Cardiovascular: Negative for chest pain and palpitations. Gastrointestinal: Negative for abdominal pain, bowel incontinence, constipation, diarrhea, nausea and vomiting. Endocrine: Negative for cold intolerance, heat intolerance, polydipsia, polyphagia and polyuria. Genitourinary: Negative for bladder incontinence, decreased urine volume, difficulty urinating, frequency and hematuria. Musculoskeletal: Positive for arthralgias, back pain, gait problem and neck pain. Negative for joint swelling, myalgias and neck stiffness. Skin: Negative for color change and rash. Allergic/Immunologic: Negative for food allergies and immunocompromised state. Neurological: Negative for dizziness, tingling, tremors, seizures, syncope, facial asymmetry, speech difficulty, weakness, light-headedness, numbness and headaches.    Hematological: Does not bruise/bleed easily. Psychiatric/Behavioral: Positive for sleep disturbance. Negative for agitation, behavioral problems, confusion, decreased concentration, dysphoric mood, hallucinations, self-injury and suicidal ideas. The patient is nervous/anxious. The patient is not hyperactive. Objective:     Vitals:    01/21/21 1239   BP: 130/80   Site: Left Upper Arm   Position: Sitting   Cuff Size: Medium Adult   Pulse: 80   Temp: 97.5 °F (36.4 °C)   TempSrc: Temporal   Weight: 156 lb (70.8 kg)   Height: 5' 9\" (1.753 m)       Physical Exam  Vitals signs and nursing note reviewed. Constitutional:       General: He is not in acute distress. Appearance: He is well-developed. He is not diaphoretic. HENT:      Head: Normocephalic and atraumatic. Right Ear: External ear normal.      Left Ear: External ear normal.      Nose: Nose normal.      Mouth/Throat:      Pharynx: No oropharyngeal exudate. Eyes:      General: No scleral icterus. Right eye: No discharge. Left eye: No discharge. Conjunctiva/sclera: Conjunctivae normal.      Pupils: Pupils are equal, round, and reactive to light. Neck:      Musculoskeletal: Full passive range of motion without pain, normal range of motion and neck supple. Normal range of motion. No edema, erythema, neck rigidity or muscular tenderness. Thyroid: No thyromegaly. Cardiovascular:      Rate and Rhythm: Normal rate and regular rhythm. Heart sounds: Normal heart sounds. No murmur. No friction rub. No gallop. Pulmonary:      Effort: Pulmonary effort is normal. No respiratory distress. Breath sounds: Normal breath sounds. No wheezing or rales. Chest:      Chest wall: No tenderness. Abdominal:      General: Bowel sounds are normal. There is no distension. Palpations: Abdomen is soft. Tenderness: There is no abdominal tenderness. There is no guarding or rebound.    Musculoskeletal:      Right shoulder: He exhibits decreased range of motion, tenderness and pain. Left shoulder: He exhibits decreased range of motion and tenderness. Right hip: He exhibits no tenderness. Left hip: He exhibits no tenderness. Right knee: He exhibits normal range of motion. Tenderness found. Left knee: He exhibits normal range of motion. Tenderness found. Right ankle: He exhibits no swelling. Left ankle: He exhibits no swelling. Cervical back: He exhibits decreased range of motion and tenderness. Thoracic back: He exhibits tenderness. Lumbar back: He exhibits decreased range of motion, tenderness and pain. Back:       Right lower leg: He exhibits no swelling. Left lower leg: He exhibits no swelling. Skin:     General: Skin is warm. Coloration: Skin is not pale. Findings: No erythema or rash. Neurological:      Mental Status: He is alert and oriented to person, place, and time. He is not disoriented. Cranial Nerves: No cranial nerve deficit. Sensory: Sensation is intact. No sensory deficit. Motor: Motor function is intact. No atrophy or abnormal muscle tone. Coordination: Coordination normal.      Gait: Gait normal.      Deep Tendon Reflexes: Reflexes are normal and symmetric. Babinski sign absent on the right side. Babinski sign absent on the left side. Comments: SLR neg   Psychiatric:         Attention and Perception: Attention normal. He is attentive. Mood and Affect: Mood is not anxious or depressed. Affect is blunt. Affect is not labile, angry or inappropriate. Speech: Speech normal. He is communicative. Speech is not rapid and pressured, delayed, slurred or tangential.         Behavior: Behavior normal. Behavior is not agitated, slowed, aggressive, withdrawn, hyperactive or combative. Behavior is cooperative. Thought Content:  Thought content normal. Thought content is not paranoid or delusional. Thought content does not include homicidal or suicidal ideation. Thought content does not include homicidal or suicidal plan. Cognition and Memory: Cognition normal. Memory is not impaired. He does not exhibit impaired recent memory or impaired remote memory. Judgment: Judgment normal. Judgment is not impulsive or inappropriate. GUNJAN test: neg  Yeoman's test: neg  Gaenslen test: neg     Assessment:     1. Lumbar spondylosis    2. Lumbar facet arthropathy    3. Adolescent idiopathic scoliosis of lumbar region    4. Spondylolisthesis of lumbar region    5. Chronic pain syndrome            Plan:      · Patient read and signed orientation and opioid agreement. · OARRS reviewed. Current MED: 0  · Patient was not offered naloxone for home. · Discussed long term side effects of medications, tolerance, dependency and addiction. · UDS preformed today. · Patient told can not receive any pain medications from any other source. · No evidence of abuse, diversion or aberrant behavior. · Prescription Needs: Will prescribe patient a short length of pain medications in good vinay until UDS come back   Medications and/or procedures to improve function and quality of life- patient understanding with this and that may not be pain free   Discussed possible weaning of medication dosing dependent on treatment/procedure results.  Discussed with patient about safe storage of medications at home   Testing: Reviewed MRI Lumbar Spine with patient   Procedures: Left L-facet MBB @ L3-4,L4-5 and L5-S1 for diagnostic purpose.  Discussed with patient about risks with procedure including infection, reaction to medication, increased pain, or bleeding.  Medications:Reviewed in detail.  If patient is on prescribed blood thinners will need Pre Op Clearance to hold blood thinners per Cardiologist or PCP for the required time frame per procedure.  This is to be completed by the nursing staff during the scheduling process. Previous Treatments tried:  · PT: Yes,  any benefit? No, how many weeks? 4, last date done: last month  · NSAIDs: No, stomach issues  · Chiropractic: Yes,  any benefit? Yes  · Muscle relaxants: No,  any benefit? No  · Narcotics: Yes,  any benefit? Yes  · Spine surgeon consult: Yes  · Any Implants: Yes    Meds. Prescribed:   Orders Placed This Encounter   Medications    traMADol (ULTRAM) 50 MG tablet     Sig: Take 1 tablet by mouth every 6 hours as needed for Pain for up to 15 days. Take lowest dose possible to manage pain     Dispense:  60 tablet     Refill:  0     Reduce doses taken as pain becomes manageable       Return Left L-facet MBB @ L3-4,L4-5 and L5-S1. Time spent with patient was 60 minutes more than 50% was spent  Counseling/coordinated the patient'scare.     Electronically signed by Allyson Montaño MD on 1/21/2021 at 1:34 PM

## 2021-02-08 ENCOUNTER — TELEPHONE (OUTPATIENT)
Dept: PHYSICAL MEDICINE AND REHAB | Age: 56
End: 2021-02-08

## 2021-02-09 ENCOUNTER — APPOINTMENT (OUTPATIENT)
Dept: GENERAL RADIOLOGY | Age: 56
End: 2021-02-09
Attending: PAIN MEDICINE
Payer: MEDICARE

## 2021-02-09 ENCOUNTER — ANESTHESIA (OUTPATIENT)
Dept: OPERATING ROOM | Age: 56
End: 2021-02-09
Payer: MEDICARE

## 2021-02-09 ENCOUNTER — HOSPITAL ENCOUNTER (OUTPATIENT)
Age: 56
Setting detail: OUTPATIENT SURGERY
Discharge: HOME | End: 2021-02-09
Attending: PAIN MEDICINE | Admitting: PAIN MEDICINE
Payer: MEDICARE

## 2021-02-09 ENCOUNTER — ANESTHESIA EVENT (OUTPATIENT)
Dept: OPERATING ROOM | Age: 56
End: 2021-02-09
Payer: MEDICARE

## 2021-02-09 VITALS
DIASTOLIC BLOOD PRESSURE: 80 MMHG | SYSTOLIC BLOOD PRESSURE: 131 MMHG | TEMPERATURE: 96.6 F | BODY MASS INDEX: 24.73 KG/M2 | WEIGHT: 167 LBS | OXYGEN SATURATION: 96 % | HEART RATE: 84 BPM | HEIGHT: 69 IN | RESPIRATION RATE: 16 BRPM

## 2021-02-09 VITALS
OXYGEN SATURATION: 98 % | SYSTOLIC BLOOD PRESSURE: 119 MMHG | RESPIRATION RATE: 6 BRPM | DIASTOLIC BLOOD PRESSURE: 76 MMHG

## 2021-02-09 PROCEDURE — 6360000002 HC RX W HCPCS: Performed by: PAIN MEDICINE

## 2021-02-09 PROCEDURE — 3209999900 FLUORO FOR SURGICAL PROCEDURES

## 2021-02-09 PROCEDURE — 6360000002 HC RX W HCPCS: Performed by: NURSE ANESTHETIST, CERTIFIED REGISTERED

## 2021-02-09 PROCEDURE — 3700000000 HC ANESTHESIA ATTENDED CARE: Performed by: PAIN MEDICINE

## 2021-02-09 PROCEDURE — 2500000003 HC RX 250 WO HCPCS: Performed by: PAIN MEDICINE

## 2021-02-09 PROCEDURE — 64494 INJ PARAVERT F JNT L/S 2 LEV: CPT | Performed by: PAIN MEDICINE

## 2021-02-09 PROCEDURE — 64493 INJ PARAVERT F JNT L/S 1 LEV: CPT | Performed by: PAIN MEDICINE

## 2021-02-09 PROCEDURE — 7100000010 HC PHASE II RECOVERY - FIRST 15 MIN: Performed by: PAIN MEDICINE

## 2021-02-09 PROCEDURE — 3600000054 HC PAIN LEVEL 3 BASE: Performed by: PAIN MEDICINE

## 2021-02-09 PROCEDURE — 2500000003 HC RX 250 WO HCPCS: Performed by: NURSE ANESTHETIST, CERTIFIED REGISTERED

## 2021-02-09 PROCEDURE — 64495 INJ PARAVERT F JNT L/S 3 LEV: CPT | Performed by: PAIN MEDICINE

## 2021-02-09 PROCEDURE — 7100000011 HC PHASE II RECOVERY - ADDTL 15 MIN: Performed by: PAIN MEDICINE

## 2021-02-09 RX ORDER — BUPIVACAINE HYDROCHLORIDE 2.5 MG/ML
INJECTION, SOLUTION EPIDURAL; INFILTRATION; INTRACAUDAL PRN
Status: DISCONTINUED | OUTPATIENT
Start: 2021-02-09 | End: 2021-02-09 | Stop reason: ALTCHOICE

## 2021-02-09 RX ORDER — LIDOCAINE HYDROCHLORIDE 10 MG/ML
INJECTION, SOLUTION INFILTRATION; PERINEURAL PRN
Status: DISCONTINUED | OUTPATIENT
Start: 2021-02-09 | End: 2021-02-09 | Stop reason: ALTCHOICE

## 2021-02-09 RX ORDER — LIDOCAINE HYDROCHLORIDE 20 MG/ML
INJECTION, SOLUTION INFILTRATION; PERINEURAL PRN
Status: DISCONTINUED | OUTPATIENT
Start: 2021-02-09 | End: 2021-02-09 | Stop reason: SDUPTHER

## 2021-02-09 RX ORDER — PROPOFOL 10 MG/ML
INJECTION, EMULSION INTRAVENOUS PRN
Status: DISCONTINUED | OUTPATIENT
Start: 2021-02-09 | End: 2021-02-09 | Stop reason: SDUPTHER

## 2021-02-09 RX ORDER — METHYLPREDNISOLONE ACETATE 80 MG/ML
INJECTION, SUSPENSION INTRA-ARTICULAR; INTRALESIONAL; INTRAMUSCULAR; SOFT TISSUE PRN
Status: DISCONTINUED | OUTPATIENT
Start: 2021-02-09 | End: 2021-02-09 | Stop reason: ALTCHOICE

## 2021-02-09 RX ADMIN — LIDOCAINE HYDROCHLORIDE 100 MG: 20 INJECTION, SOLUTION INFILTRATION; PERINEURAL at 11:11

## 2021-02-09 RX ADMIN — PROPOFOL 75 MG: 10 INJECTION, EMULSION INTRAVENOUS at 11:11

## 2021-02-09 ASSESSMENT — PULMONARY FUNCTION TESTS
PIF_VALUE: 0

## 2021-02-09 ASSESSMENT — PAIN SCALES - GENERAL: PAINLEVEL_OUTOF10: 0

## 2021-02-09 ASSESSMENT — PAIN DESCRIPTION - DESCRIPTORS: DESCRIPTORS: ACHING;CONSTANT

## 2021-02-09 NOTE — H&P
methylPREDNISolone (MEDROL DOSEPACK) 4 MG tablet, Take 4 mg by mouth See Admin Instructions Take by mouth., Disp: , Rfl:     [START ON 1/22/2021] traMADol (ULTRAM) 50 MG tablet, Take 1 tablet by mouth every 6 hours as needed for Pain for up to 15 days. Take lowest dose possible to manage pain, Disp: 60 tablet, Rfl: 0    Cyanocobalamin (VITAMIN B 12) 500 MCG TABS, Take 1 tablet by mouth daily, Disp: , Rfl:     Omega-3 Fatty Acids (FISH OIL) 1200 MG CPDR, Take by mouth daily Omega 3, Disp: , Rfl:     acetaminophen (TYLENOL) 500 MG tablet, Take 1,000 mg by mouth daily, Disp: , Rfl:         Subjective:      Review of Systems   Constitutional: Positive for activity change. Negative for appetite change, chills, diaphoresis, fatigue, fever and unexpected weight change. HENT: Positive for hearing loss. Negative for congestion, ear pain, mouth sores, nosebleeds, rhinorrhea, sinus pressure and sore throat. HEARING AIDS   Eyes: Negative for photophobia, pain and visual disturbance. Respiratory: Negative for cough, chest tightness, shortness of breath and wheezing. Cardiovascular: Negative for chest pain and palpitations. Gastrointestinal: Negative for abdominal pain, bowel incontinence, constipation, diarrhea, nausea and vomiting. Endocrine: Negative for cold intolerance, heat intolerance, polydipsia, polyphagia and polyuria. Genitourinary: Negative for bladder incontinence, decreased urine volume, difficulty urinating, frequency and hematuria. Musculoskeletal: Positive for arthralgias, back pain, gait problem and neck pain. Negative for joint swelling, myalgias and neck stiffness. Skin: Negative for color change and rash. Allergic/Immunologic: Negative for food allergies and immunocompromised state. Neurological: Negative for dizziness, tingling, tremors, seizures, syncope, facial asymmetry, speech difficulty, weakness, light-headedness, numbness and headaches.    Hematological: Does not bruise/bleed easily. Psychiatric/Behavioral: Positive for sleep disturbance. Negative for agitation, behavioral problems, confusion, decreased concentration, dysphoric mood, hallucinations, self-injury and suicidal ideas. The patient is nervous/anxious. The patient is not hyperactive.          Objective:      Vitals       Vitals:     01/21/21 1239   BP: 130/80   Site: Left Upper Arm   Position: Sitting   Cuff Size: Medium Adult   Pulse: 80   Temp: 97.5 °F (36.4 °C)   TempSrc: Temporal   Weight: 156 lb (70.8 kg)   Height: 5' 9\" (1.753 m)            Physical Exam  Vitals signs and nursing note reviewed. Constitutional:       General: He is not in acute distress. Appearance: He is well-developed. He is not diaphoretic. HENT:      Head: Normocephalic and atraumatic. Right Ear: External ear normal.      Left Ear: External ear normal.      Nose: Nose normal.      Mouth/Throat:      Pharynx: No oropharyngeal exudate. Eyes:      General: No scleral icterus. Right eye: No discharge. Left eye: No discharge. Conjunctiva/sclera: Conjunctivae normal.      Pupils: Pupils are equal, round, and reactive to light. Neck:      Musculoskeletal: Full passive range of motion without pain, normal range of motion and neck supple. Normal range of motion. No edema, erythema, neck rigidity or muscular tenderness. Thyroid: No thyromegaly. Cardiovascular:      Rate and Rhythm: Normal rate and regular rhythm. Heart sounds: Normal heart sounds. No murmur. No friction rub. No gallop. Pulmonary:      Effort: Pulmonary effort is normal. No respiratory distress. Breath sounds: Normal breath sounds. No wheezing or rales. Chest:      Chest wall: No tenderness. Abdominal:      General: Bowel sounds are normal. There is no distension. Palpations: Abdomen is soft. Tenderness: There is no abdominal tenderness. There is no guarding or rebound.    Musculoskeletal:      Right shoulder: He exhibits decreased range of motion, tenderness and pain. Left shoulder: He exhibits decreased range of motion and tenderness. Right hip: He exhibits no tenderness. Left hip: He exhibits no tenderness. Right knee: He exhibits normal range of motion. Tenderness found. Left knee: He exhibits normal range of motion. Tenderness found. Right ankle: He exhibits no swelling. Left ankle: He exhibits no swelling. Cervical back: He exhibits decreased range of motion and tenderness. Thoracic back: He exhibits tenderness. Lumbar back: He exhibits decreased range of motion, tenderness and pain. Back:       Right lower leg: He exhibits no swelling. Left lower leg: He exhibits no swelling. Skin:     General: Skin is warm. Coloration: Skin is not pale. Findings: No erythema or rash. Neurological:      Mental Status: He is alert and oriented to person, place, and time. He is not disoriented. Cranial Nerves: No cranial nerve deficit. Sensory: Sensation is intact. No sensory deficit. Motor: Motor function is intact. No atrophy or abnormal muscle tone. Coordination: Coordination normal.      Gait: Gait normal.      Deep Tendon Reflexes: Reflexes are normal and symmetric. Babinski sign absent on the right side. Babinski sign absent on the left side. Comments: SLR neg   Psychiatric:         Attention and Perception: Attention normal. He is attentive. Mood and Affect: Mood is not anxious or depressed. Affect is blunt. Affect is not labile, angry or inappropriate. Speech: Speech normal. He is communicative. Speech is not rapid and pressured, delayed, slurred or tangential.         Behavior: Behavior normal. Behavior is not agitated, slowed, aggressive, withdrawn, hyperactive or combative. Behavior is cooperative. Thought Content:  Thought content normal. Thought content is not paranoid or delusional. Thought

## 2021-02-09 NOTE — ANESTHESIA POSTPROCEDURE EVALUATION
Department of Anesthesiology  Postprocedure Note    Patient: Carmen Payne  MRN: [id number]  YOB: 1965  Date of evaluation: 2/9/2021  Time:  12:03 PM     Procedure Summary     Date: 02/09/21 Room / Location: [address] [place] [address] 03 / [address] [facility]    Anesthesia Start: 1108 Anesthesia Stop: 1011    Procedure: Left L-facet MBB @ L3-4,L4-5 and L5-S1 (Left Back) Diagnosis: (Lumbar spondylosis)    Surgeons: Judy Jimenes MD Responsible Provider: Mendez Barajas MD    Anesthesia Type: MAC ASA Status: 3          Anesthesia Type: MAC    Robin Phase I:      Robin Phase II: Robin Score: 10    Last vitals: Reviewed and per EMR flowsheets.        Anesthesia Post Evaluation

## 2021-02-09 NOTE — OP NOTE
needles in divided doses. EBL-0    For L5 Median branch block the junction of the ala of  the sacrum with the superior articular process of the facet joint was taken as a reference point and L4 median branch the junction of the transverse process the L5 with the superolateral possible facet joint was taken to the point and healthy median branch the junction of the transverse process of L4 with the superior lateral process of the facet joint was taken as a reference point. For S1 median branch the most lateral and superior aspect of S1 foramina was taken as a reference point. Patient's vital signs and neurological status remained stable through  the procedure and post procedural  Period. Patient was instructed to keep track of pain for next 24 hours. every hour and bring it with next visit. Patient tollerated the procedure well and was discharged home in stable condition.     Electronically signed by Efrain Bailey MD on 2/9/21 at 11:11 AM EST

## 2021-02-09 NOTE — PROGRESS NOTES
Discharge instructions given to pt. Belongings packed and sent with pt. Pt verbalized understanding of instructions.

## 2021-02-09 NOTE — PROGRESS NOTES
1119 To recovery via cart. Spont resp. VSS. Report received from surgical rn. Pt denies pain numbness tingling or nausea. Snack and drink given. Call bell in reach  1135 IV discontinued.  Up to dress self  [phone] Waiting for ride  1144 Discharge to home in stable ambulatory condition with friend

## 2021-02-23 ENCOUNTER — OFFICE VISIT (OUTPATIENT)
Dept: PHYSICAL MEDICINE AND REHAB | Age: 56
End: 2021-02-23
Payer: MEDICARE

## 2021-02-23 VITALS
SYSTOLIC BLOOD PRESSURE: 138 MMHG | BODY MASS INDEX: 24.73 KG/M2 | DIASTOLIC BLOOD PRESSURE: 84 MMHG | TEMPERATURE: 97.5 F | HEIGHT: 69 IN | WEIGHT: 167 LBS

## 2021-02-23 DIAGNOSIS — M47.816 LUMBAR FACET ARTHROPATHY: ICD-10-CM

## 2021-02-23 DIAGNOSIS — G89.4 CHRONIC PAIN SYNDROME: ICD-10-CM

## 2021-02-23 DIAGNOSIS — M43.16 SPONDYLOLISTHESIS OF LUMBAR REGION: ICD-10-CM

## 2021-02-23 DIAGNOSIS — M47.816 LUMBAR SPONDYLOSIS: Primary | ICD-10-CM

## 2021-02-23 PROCEDURE — G8427 DOCREV CUR MEDS BY ELIG CLIN: HCPCS | Performed by: NURSE PRACTITIONER

## 2021-02-23 PROCEDURE — 1036F TOBACCO NON-USER: CPT | Performed by: NURSE PRACTITIONER

## 2021-02-23 PROCEDURE — 3017F COLORECTAL CA SCREEN DOC REV: CPT | Performed by: NURSE PRACTITIONER

## 2021-02-23 PROCEDURE — G8484 FLU IMMUNIZE NO ADMIN: HCPCS | Performed by: NURSE PRACTITIONER

## 2021-02-23 PROCEDURE — 99213 OFFICE O/P EST LOW 20 MIN: CPT | Performed by: NURSE PRACTITIONER

## 2021-02-23 PROCEDURE — G8420 CALC BMI NORM PARAMETERS: HCPCS | Performed by: NURSE PRACTITIONER

## 2021-02-23 ASSESSMENT — ENCOUNTER SYMPTOMS: BACK PAIN: 1

## 2021-02-23 NOTE — PROGRESS NOTES
[facility] [address]  Dept: [phone]  Dept Fax: [phone]: [phone]    Visit Date: 2/23/2021    Functionality Assessment/Goals Worksheet     On a scale of 0 (Does not Interfere) to 10 (Completely Interferes)     1. Which number describes how during the past week pain has interfered with       the following:  A. General Activity:  2  B. Mood: 2  C. Walking Ability:  8  D. Normal Work (Includes both work outside the home and housework):  8  E. Relations with Other People:   8  F. Sleep:   8  G. Enjoyment of Life:   8    2. Patient Prefers to Take their Pain Medications:     []  On a regular basis   [x]  Only when necessary    []  Does not take pain medications    3. What are the Patient's Goals/Expectations for Visiting Pain Management? [x]  Learn about my pain    []  Receive Medication   []  Physical Therapy     []  Treat Depression   []  Receive Injections    []  Treat Sleep   []  Deal with Anxiety and Stress   []  Treat Opoid Dependence/Addiction   []  Other:      HPI:   Marissa Tejeda is a 54 y.o. male is here today for    Chief Complaint: Low back pain, post op right shoulder pain     HPI   FU from left L-facet MBB # 1 from 2/9/2021. Reports no relief at all from procedure. States pain is all in lower back. Patient reports that he is wasting his time here and wants surgery. Then he got up and walked out of the room      Had a right shoulder total replacement at [facility] the day after procedure on       The patienthas No Known Allergies. Past Medical History  Willis Bellamy  has a past medical history of Arthritis, COPD (chronic obstructive pulmonary disease) ([facility] [address] Hepatitis, Recovering alcoholic ([facility] [address] and Scoliosis.     Past Surgical History  The patient  has a past surgical history that includes Dental surgery; Hand replantation; cervical fusion (N/A, 7/27/2020); Tonsillectomy; Colonoscopy (Left, 8/18/2020); and Pain management procedure (Left, 2/9/2021). Family History  This patient's family history includes Heart Attack in his father and mother. Social History  Sal Parrish  reports that he quit smoking about 7 months ago. His smoking use included cigarettes. He smoked 1.00 pack per day. He has never used smokeless tobacco. He reports current drug use. Drug: Marijuana. He reports that he does not drink alcohol. Medications    Current Outpatient Medications:     traMADol (ULTRAM) 50 MG tablet, Take 50 mg by mouth every 6 hours as needed. , Disp: , Rfl:     ketorolac (TORADOL) 10 MG tablet, Take 10 mg by mouth every 6 hours as needed For 2 days, Disp: , Rfl:     methylPREDNISolone (MEDROL DOSEPACK) 4 MG tablet, Take 4 mg by mouth See Admin Instructions Take by mouth., Disp: , Rfl:     Cyanocobalamin (VITAMIN B 12) 500 MCG TABS, Take 1 tablet by mouth daily, Disp: , Rfl:     Omega-3 Fatty Acids (FISH OIL) 1200 MG CPDR, Take by mouth daily Omega 3, Disp: , Rfl:     acetaminophen (TYLENOL) 500 MG tablet, Take 1,000 mg by mouth daily, Disp: , Rfl:     Subjective:      Review of Systems   Musculoskeletal: Positive for arthralgias, back pain, gait problem and myalgias. Right arm in sling    Neurological: Positive for weakness and numbness. Psychiatric/Behavioral: Positive for agitation and dysphoric mood. The patient is nervous/anxious. Objective:     Vitals:    02/23/21 1047   BP: 138/84   Site: Left Lower Arm   Position: Sitting   Temp: 97.5 °F (36.4 °C)   TempSrc: Oral   Weight: 167 lb (75.8 kg)   Height: 5' 9\" (1.753 m)       Physical Exam  Vitals signs and nursing note reviewed. Constitutional:       General: He is not in acute distress. Appearance: He is well-developed. He is not diaphoretic. HENT:      Head: Normocephalic and atraumatic.       Right Ear: External ear normal.      Left Ear: External ear normal.      Nose: Nose normal.      Mouth/Throat:      Pharynx: No oropharyngeal exudate. Eyes:      General: No scleral icterus. Right eye: No discharge. Left eye: No discharge. Conjunctiva/sclera: Conjunctivae normal.      Pupils: Pupils are equal, round, and reactive to light. Neck:      Musculoskeletal: Full passive range of motion without pain, normal range of motion and neck supple. Normal range of motion. No edema, erythema, neck rigidity or muscular tenderness. Thyroid: No thyromegaly. Cardiovascular:      Rate and Rhythm: Normal rate and regular rhythm. Heart sounds: Normal heart sounds. No murmur. No friction rub. No gallop. Pulmonary:      Effort: Pulmonary effort is normal. No respiratory distress. Breath sounds: Normal breath sounds. No wheezing or rales. Chest:      Chest wall: No tenderness. Abdominal:      General: Bowel sounds are normal. There is no distension. Palpations: Abdomen is soft. Tenderness: There is no abdominal tenderness. There is no guarding or rebound. Musculoskeletal:      Right shoulder: He exhibits decreased range of motion, tenderness and pain. Left shoulder: He exhibits decreased range of motion and tenderness. Right hip: He exhibits no tenderness. Left hip: He exhibits no tenderness. Right knee: He exhibits normal range of motion. Tenderness found. Left knee: He exhibits normal range of motion. Tenderness found. Right ankle: He exhibits no swelling. Left ankle: He exhibits no swelling. Cervical back: He exhibits decreased range of motion and tenderness. Thoracic back: He exhibits tenderness. Lumbar back: He exhibits decreased range of motion, tenderness and pain. Back:       Right lower leg: He exhibits no swelling. Left lower leg: He exhibits no swelling. Skin:     General: Skin is warm. Coloration: Skin is not pale. Findings: No erythema or rash. Neurological:      Mental Status: He is alert and oriented to person, place, and time. He is not disoriented. Cranial Nerves: No cranial nerve deficit. Sensory: Sensation is intact. No sensory deficit. Motor: Motor function is intact. No atrophy or abnormal muscle tone. Coordination: Coordination normal.      Gait: Gait normal.      Deep Tendon Reflexes: Reflexes are normal and symmetric. Babinski sign absent on the right side. Babinski sign absent on the left side. Comments: SLR neg   Psychiatric:         Attention and Perception: Attention normal. He is attentive. Mood and Affect: Mood is not anxious or depressed. Affect is blunt. Affect is not labile, angry or inappropriate. Speech: Speech normal. He is communicative. Speech is not rapid and pressured, delayed, slurred or tangential.         Behavior: Behavior is not agitated, slowed, aggressive, withdrawn, hyperactive or combative. Behavior is cooperative. Thought Content: Thought content is not paranoid or delusional. Thought content does not include homicidal or suicidal ideation. Thought content does not include homicidal or suicidal plan. Cognition and Memory: Cognition normal. Memory is not impaired. He does not exhibit impaired recent memory or impaired remote memory. Judgment: Judgment is not impulsive or inappropriate. Comments: Agitated           Assessment:     1. Lumbar spondylosis    2. Lumbar facet arthropathy    3. Spondylolisthesis of lumbar region    4. Chronic pain syndrome            Plan:      · OARRS reviewed. Current MED: 15.00  · Patient was not offered naloxone for home. · Discussed long term side effects of medications, tolerance, dependency and addiction. · Previous UDS reviewed  · UDS preformed today for compliance. · Patient told can not receive any pain medications from any other source. · No evidence of abuse, diversion or aberrant behavior.    Medications and/or procedures to improve function and quality of life- patient understanding with this and that may not be pain free   Discussed with patient about safe storage of medications at home   Discussed possible weaning of medication dosing dependent on treatment/procedure results.  Discussed with patient about risks with procedure including infection, reaction to medication, increased pain, or bleeding.  Procedure notes reviewed in detail   No relief from Left L-facet MBB. He states that he wants surgery and he walked out of room. Meds. Prescribed:   No orders of the defined types were placed in this encounter. Return if symptoms worsen or fail to improve.                Electronically signed by Gevena Sandifer, APRN - CNP on2/23/2021 at 11:02 AM

## 2021-03-01 NOTE — ANESTHESIA PRE PROCEDURE
Department of Anesthesiology  Preprocedure Note       Name:  Monica Mejía   Age:  54 y.o.  :  1965                                          MRN:  [id number]         Date:  2021      Surgeon: Sharon Brizuela):  Peewee Thurman MD    Procedure: Procedure(s):  Left L-facet MBB @ L3-4,L4-5 and L5-S1    Medications prior to admission:   Prior to Admission medications    Medication Sig Start Date End Date Taking? Authorizing Provider   traMADol (ULTRAM) 50 MG tablet Take 50 mg by mouth every 6 hours as needed. Historical Provider, MD   ketorolac (TORADOL) 10 MG tablet Take 10 mg by mouth every 6 hours as needed For 2 days    Historical Provider, MD   methylPREDNISolone (MEDROL DOSEPACK) 4 MG tablet Take 4 mg by mouth See Admin Instructions Take by mouth. Historical Provider, MD   Cyanocobalamin (VITAMIN B 12) 500 MCG TABS Take 1 tablet by mouth daily    Historical Provider, MD   Omega-3 Fatty Acids (FISH OIL) 1200 MG CPDR Take by mouth daily Golden 3    Historical Provider, MD   acetaminophen (TYLENOL) 500 MG tablet Take 1,000 mg by mouth daily    Historical Provider, MD       Current medications:    No current facility-administered medications for this encounter.         Allergies:  No Known Allergies    Problem List:    Patient Active Problem List   Diagnosis Code    Spondylolisthesis of cervical region M43.12       Past Medical History:        Diagnosis Date    Arthritis     [facility]ch[facility] obstructive [facility] disease) ([facility] [address]     Hepatitis     Recovering alcoholic ([facility] [address]     Scoliosis        Past Surgical History:        Procedure Laterality Date    CERVICAL FUSION N/A 2020    ACDF C3-C7 WITH MEDTRONIC performed by Misty Wesley MD at [address] 2020    COLONOSCOPY POLYPECTOMY SNARE/COLD BIOPSY [facility]      HAND REPLANTATION      laceration repair at 25years of age   [facility] TONSILLECTOMY Please send a copy of this office note to her PCP Bienvenido Ibrahim MD at [facility] in [place].  Thanks! Social History:    Social History     Tobacco Use    Smoking status: Former Smoker     Packs/day: 1.00     Types: Cigarettes     Quit date: 2020     Years since quittin.6    Smokeless tobacco: Never Used   Substance Use Topics    Alcohol use: No     Comment: quit 6 1/2 years ago                                Counseling given: Not Answered      Vital Signs (Current): There were no vitals filed for this visit. BP Readings from Last 3 Encounters:   21 130/80   20 (!) 137/92   20 132/89       NPO Status:                                                                                 BMI:   Wt Readings from Last 3 Encounters:   21 156 lb (70.8 kg)   20 155 lb (70.3 kg)   20 156 lb 9.6 oz (71 kg)     There is no height or weight on file to calculate BMI.    CBC: No results found for: WBC, RBC, HGB, HCT, MCV, RDW, PLT    CMP: No results found for: NA, K, CL, CO2, BUN, CREATININE, GFRAA, AGRATIO, LABGLOM, GLUCOSE, PROT, CALCIUM, BILITOT, ALKPHOS, AST, ALT    POC Tests: No results for input(s): POCGLU, POCNA, POCK, POCCL, POCBUN, POCHEMO, POCHCT in the last 72 hours.     Coags: No results found for: PROTIME, INR, APTT    HCG (If Applicable): No results found for: PREGTESTUR, PREGSERUM, HCG, HCGQUANT     ABGs: No results found for: PHART, PO2ART, TTK9KPO, ZSK2LOH, BEART, [id number]     Type & Screen (If Applicable):  Lab Results   Component Value Date    LABRH POS 2020       Drug/Infectious Status (If Applicable):  No results found for: HIV, HEPCAB    COVID-19 Screening (If Applicable):   Lab Results   Component Value Date    COVID19 NOT DETECTED 2020    COVID19 Not Detected 2020         Anesthesia Evaluation    Airway: Mallampati: II       Mouth opening: > = 3 FB Dental:          Pulmonary:   (+) COPD:                             Cardiovascular:                      Neuro/Psych:   (+) psychiatric history: GI/Hepatic/Renal:   (+) liver disease:,           Endo/Other:                     Abdominal:           Vascular:                                        Anesthesia Plan      MAC     ASA 3             Anesthetic plan and risks discussed with patient. Plan discussed with CRNA.                   Jairon Abebe MD   2/9/2021

## 2022-10-22 ENCOUNTER — APPOINTMENT (OUTPATIENT)
Dept: GENERAL RADIOLOGY | Age: 57
End: 2022-10-22
Payer: MEDICARE

## 2022-10-22 ENCOUNTER — HOSPITAL ENCOUNTER (EMERGENCY)
Age: 57
Discharge: HOME | End: 2022-10-22
Attending: EMERGENCY MEDICINE
Payer: MEDICARE

## 2022-10-22 VITALS
TEMPERATURE: 98.1 F | DIASTOLIC BLOOD PRESSURE: 79 MMHG | RESPIRATION RATE: 16 BRPM | SYSTOLIC BLOOD PRESSURE: 139 MMHG | WEIGHT: 170 LBS | HEART RATE: 88 BPM | OXYGEN SATURATION: 97 % | HEIGHT: 69 IN | BODY MASS INDEX: 25.18 KG/M2

## 2022-10-22 DIAGNOSIS — L03.119 CELLULITIS OF HAND: Primary | ICD-10-CM

## 2022-10-22 PROCEDURE — 73130 X-RAY EXAM OF HAND: CPT

## 2022-10-22 PROCEDURE — 99283 EMERGENCY DEPT VISIT LOW MDM: CPT

## 2022-10-22 PROCEDURE — 2500000003 HC RX 250 WO HCPCS: Performed by: STUDENT IN AN ORGANIZED HEALTH CARE EDUCATION/TRAINING PROGRAM

## 2022-10-22 RX ORDER — LIDOCAINE HYDROCHLORIDE 10 MG/ML
5 INJECTION, SOLUTION EPIDURAL; INFILTRATION; INTRACAUDAL; PERINEURAL ONCE
Status: COMPLETED | OUTPATIENT
Start: 2022-10-22 | End: 2022-10-22

## 2022-10-22 RX ORDER — AMOXICILLIN AND CLAVULANATE POTASSIUM 875; 125 MG/1; MG/1
1 TABLET, FILM COATED ORAL 2 TIMES DAILY
Qty: 14 TABLET | Refills: 0 | Status: SHIPPED | OUTPATIENT
Start: 2022-10-22 | End: 2022-10-29

## 2022-10-22 RX ORDER — LIDOCAINE HYDROCHLORIDE 10 MG/ML
5 INJECTION, SOLUTION EPIDURAL; INFILTRATION; INTRACAUDAL; PERINEURAL ONCE
Status: DISCONTINUED | OUTPATIENT
Start: 2022-10-22 | End: 2022-10-22

## 2022-10-22 RX ADMIN — LIDOCAINE HYDROCHLORIDE 5 ML: 10 INJECTION, SOLUTION EPIDURAL; INFILTRATION; INTRACAUDAL; PERINEURAL at 16:31

## 2022-10-22 ASSESSMENT — PAIN - FUNCTIONAL ASSESSMENT: PAIN_FUNCTIONAL_ASSESSMENT: 0-10

## 2022-10-22 ASSESSMENT — PAIN SCALES - GENERAL: PAINLEVEL_OUTOF10: 6

## 2022-10-22 NOTE — ED TRIAGE NOTES
Presents to ER with concern of right hand pain. Reports splinter in hand on Thursday. Reports pain 6/10. There is swelling and redness noted to palm.

## 2022-10-22 NOTE — DISCHARGE INSTRUCTIONS
Do not hesitate to return to the emergency department if you are experiencing any new or worsening symptoms such as fever, pain, swelling, redness, abnormal drainage from your wound, numbness, tingling, weakness, or if you have any other concerns. Follow-up with your primary care provider and with the [facility] [place] regarding your emergency department visit today. Begin taking Augmentin as prescribed. It is also okay to use a triple antibiotic ointment such as Neosporin over-the-counter as directed on the packaging for your wound.

## 2022-10-22 NOTE — ED PROVIDER NOTES
[place] ENCOUNTER          Pt Name: Peggy Cain  MRN: [id number]  [place] 1965  Date of evaluation: 10/22/2022  Treating Resident Physician: Renetta Uribe DO  Supervising Physician: Dr. Mahnaz Sandoval    History obtained from the patient. CHIEF COMPLAINT       Chief Complaint   Patient presents with    Hand Pain           HISTORY OF PRESENT ILLNESS    HPI  Peggy Cain is a 64 y.o. male who presents to the emergency department for evaluation of hand pain. The patient states that he was helping a friend move 2 days ago when he picked up a stick and felt that he got a splinter. He reports that he was unable to see a splinter and that he tried to open up this area with a razor blade. He reports increased pain and swelling to his right palm. He denies fevers and abnormal drainage. The patient states that he had a Tdap in 2018 and the latest Tdap and the chart was on 7/14/2018. The patient has no other acute complaints at this time. REVIEW OF SYSTEMS   Review of Systems   Constitutional:  Negative for fever. Musculoskeletal:  Positive for arthralgias. Skin:  Positive for wound. All other systems reviewed and are negative.       PAST MEDICAL AND SURGICAL HISTORY     Past Medical History:   Diagnosis Date    Arthritis     COPD (chronic obstructive pulmonary disease) ([facility] [address]     Hepatitis     Recovering alcoholic ([facility] [address]     Scoliosis      Past Surgical History:   Procedure Laterality Date    CERVICAL FUSION N/A 7/27/2020    ACDF C3-C7 WITH MEDTRONIC performed by Grazyna Sousa MD at [place] [address] Left 8/18/2020    COLONOSCOPY POLYPECTOMY SNARE/COLD [facility] [address] [facility]      laceration repair at 25years of age    PAIN MANAGEMENT PROCEDURE Left 2/9/2021    Left L-facet MBB @ L3-4,L4-5 and L5-S1 performed by Sandoval Jones MD at [facility] OR    TONSILLECTOMY           MEDICATIONS   No current facility-administered medications for this encounter. Current Outpatient Medications:     amoxicillin-clavulanate (AUGMENTIN) 875-125 MG per tablet, Take 1 tablet by mouth 2 times daily for 7 days, Disp: 14 tablet, Rfl: 0    traMADol (ULTRAM) 50 MG tablet, Take 50 mg by mouth every 6 hours as needed. , Disp: , Rfl:     ketorolac (TORADOL) 10 MG tablet, Take 10 mg by mouth every 6 hours as needed For 2 days, Disp: , Rfl:     methylPREDNISolone (MEDROL DOSEPACK) 4 MG tablet, Take 4 mg by mouth See Admin Instructions Take by mouth., Disp: , Rfl:     Cyanocobalamin (VITAMIN B 12) 500 MCG TABS, Take 1 tablet by mouth daily, Disp: , Rfl:     Omega-3 Fatty Acids (FISH OIL) 1200 MG CPDR, Take by mouth daily Omega 3, Disp: , Rfl:     acetaminophen (TYLENOL) 500 MG tablet, Take 1,000 mg by mouth daily, Disp: , Rfl:       SOCIAL HISTORY     Social History     Social History Narrative    Not on file     Social History     Tobacco Use    Smoking status: Former     Packs/day: 1.00     Types: Cigarettes     Quit date: 2020     Years since quittin.3    Smokeless tobacco: Never   Vaping Use    Vaping Use: Former    Substances: Occasionally   Substance Use Topics    Alcohol use: No     Comment: quit 13 years ago    Drug use: Yes     Types: Marijuana (Weed)     Comment: daily smoker of , last a couple of days ago         ALLERGIES   No Known Allergies      FAMILY HISTORY     Family History   Problem Relation Age of Onset    Heart Attack Mother     Heart Attack Father          PREVIOUS RECORDS   Previous records reviewed        PHYSICAL EXAM     ED Triage Vitals [10/22/22 1536]   BP Temp Temp Source Heart Rate Resp SpO2 Height Weight   139/79 98.1 °F (36.7 °C) Oral 88 16 97 % 5' 9\" (1.753 m) 170 lb (77.1 kg)     Initial vital signs and nursing assessment reviewed and normal. Body mass index is 25.1 kg/m². Pulsoximetry is normal per my interpretation.     Additional Vital Signs:  Vitals:    10/22/22 1536   BP: 139/79   Pulse: 88   Resp: 16   Temp: 98.1 °F (36.7 °C)   SpO2: 97%       Physical Exam  Constitutional:       General: He is not in acute distress. Appearance: Normal appearance. He is not ill-appearing. HENT:      Nose: Nose normal. No congestion or rhinorrhea. Eyes:      Extraocular Movements: Extraocular movements intact. Cardiovascular:      Rate and Rhythm: Normal rate. Comments: Normal radial pulses bilaterally. Pulmonary:      Effort: Pulmonary effort is normal.   Musculoskeletal:         General: Swelling and tenderness present. Normal range of motion. Cervical back: Normal range of motion. Comments: There is an approximately 1 cm superficial laceration with some exposed fat at the distal aspect to the right palm with surrounding induration and faint erythema. No foreign body visible. Point-of-care soft tissue ultrasound with the linear high-frequency probe remarkable for cobblestoning and no discrete focal fluid collection. There is pain with passive extension of digits 3 and 4 on the right hand. Skin:     General: Skin is warm and dry. Neurological:      General: No focal deficit present. Mental Status: He is alert and oriented to person, place, and time. Mental status is at baseline. MEDICAL DECISION MAKING   Initial Assessment:   59-year-old male presenting to the emergency department for evaluation of right hand wound. Likely superficial cellulitis. Kanavel's signs negative except for passive extension of digits 3 and 4. Wound partially opened at bedside with an 11 blade scalpel by my supervising physician after cleaning the area with Betadine and performing a partial ring block with 1% lidocaine without epinephrine with no visible foreign body or purulent discharge. Plan:   Discharge home with PCP and orthopedic surgery follow-up. 7-day course of Augmentin.         ED RESULTS   Laboratory results:  Labs Reviewed - No data to display    Radiologic studies results:  XR HAND RIGHT (MIN 3 VIEWS)   Final Result   No acute findings. No foreign body seen. .            **This report has been created using voice recognition software. It may contain minor errors which are inherent in voice recognition technology. **      Final report electronically signed by Dr. Gagandeep Ballard on 10/22/2022 4:04 PM          ED Medications administered this visit:   Medications   lidocaine PF 1 % injection 5 mL (5 mLs IntraDERmal Given 10/22/22 1631)         ED COURSE        Strict return precautions and follow up instructions were discussed with the patient prior to discharge, with which the patient agrees. AVS discharge instructions:    Do not hesitate to return to the emergency department if you are experiencing any new or worsening symptoms such as fever, pain, swelling, redness, abnormal drainage from your wound, numbness, tingling, weakness, or if you have any other concerns. Follow-up with your primary care provider and with the [facility] [place] [facility] regarding your emergency department visit today. Begin taking Augmentin as prescribed. It is also okay to use a triple antibiotic ointment such as Neosporin over-the-counter as directed on the packaging for your wound. MEDICATION CHANGES     New Prescriptions    AMOXICILLIN-CLAVULANATE (AUGMENTIN) 875-125 MG PER TABLET    Take 1 tablet by mouth 2 times daily for 7 days         FINAL DISPOSITION     Final diagnoses:   Cellulitis of hand     Condition: condition: good  Dispo: Discharge to home      This transcription was electronically signed. Parts of this transcriptions may have been dictated by use of voice recognition software and electronically transcribed, and parts may have been transcribed with the assistance of an ED scribe. The transcription may contain errors not detected in proofreading.   Please refer to my supervising physician's documentation if my documentation differs.     Electronically Signed: Alina Rivera DO, 10/22/22, 5:15 PM          Alina Rivera DO  Resident  10/22/22 9139

## 2023-03-07 ENCOUNTER — HOSPITAL ENCOUNTER (OUTPATIENT)
Dept: ULTRASOUND IMAGING | Age: 58
Discharge: HOME | End: 2023-03-07
Payer: MEDICARE

## 2023-03-07 DIAGNOSIS — R22.41 KNEE MASS, RIGHT: ICD-10-CM

## 2023-03-07 PROCEDURE — 76882 US LMTD JT/FCL EVL NVASC XTR: CPT

## 2023-09-21 ENCOUNTER — HOSPITAL ENCOUNTER (OUTPATIENT)
Dept: MRI IMAGING | Age: 58
Discharge: HOME | End: 2023-09-21
Attending: ORTHOPAEDIC SURGERY
Payer: MEDICARE

## 2023-09-21 DIAGNOSIS — M25.512 LEFT SHOULDER PAIN, UNSPECIFIED CHRONICITY: ICD-10-CM

## 2023-09-21 PROCEDURE — 73221 MRI JOINT UPR EXTREM W/O DYE: CPT

## 2023-11-24 ENCOUNTER — HOSPITAL ENCOUNTER (EMERGENCY)
Age: 58
Discharge: HOME | End: 2023-11-24
Payer: MEDICARE

## 2023-11-24 VITALS
SYSTOLIC BLOOD PRESSURE: 143 MMHG | DIASTOLIC BLOOD PRESSURE: 100 MMHG | OXYGEN SATURATION: 96 % | HEART RATE: 82 BPM | TEMPERATURE: 98 F | RESPIRATION RATE: 18 BRPM

## 2023-11-24 DIAGNOSIS — M25.512 ACUTE PAIN OF LEFT SHOULDER: Primary | ICD-10-CM

## 2023-11-24 PROCEDURE — 99283 EMERGENCY DEPT VISIT LOW MDM: CPT

## 2023-11-24 RX ORDER — HYDROCODONE BITARTRATE AND ACETAMINOPHEN 5; 325 MG/1; MG/1
1 TABLET ORAL EVERY 6 HOURS PRN
Qty: 12 TABLET | Refills: 0 | Status: SHIPPED | OUTPATIENT
Start: 2023-11-24 | End: 2023-11-27

## 2023-11-24 ASSESSMENT — PAIN SCALES - GENERAL: PAINLEVEL_OUTOF10: 8

## 2023-11-24 ASSESSMENT — PAIN - FUNCTIONAL ASSESSMENT: PAIN_FUNCTIONAL_ASSESSMENT: 0-10

## 2023-11-24 ASSESSMENT — PAIN DESCRIPTION - LOCATION: LOCATION: SHOULDER

## 2023-11-24 ASSESSMENT — PAIN DESCRIPTION - ORIENTATION: ORIENTATION: LEFT

## 2023-11-24 NOTE — ED TRIAGE NOTES
Pt presents to the ED for left shoulder pain. Pt states he has chronic pain and had a MRI a month ago of his shoulder. Pt messaged the surgeon who fixed his right shoulder. Pt denies any new injury.

## 2023-12-14 ENCOUNTER — HOSPITAL ENCOUNTER (OUTPATIENT)
Dept: INTERVENTIONAL RADIOLOGY/VASCULAR | Age: 58
Discharge: HOME | End: 2023-12-14
Payer: MEDICARE

## 2023-12-14 DIAGNOSIS — M75.82 TENDINITIS OF LEFT ROTATOR CUFF: ICD-10-CM

## 2023-12-14 PROCEDURE — 6360000004 HC RX CONTRAST MEDICATION: Performed by: RADIOLOGY

## 2023-12-14 PROCEDURE — 2709999900 IR FLUORO GUIDED NEEDLE PLACEMENT

## 2023-12-14 PROCEDURE — 6360000002 HC RX W HCPCS: Performed by: RADIOLOGY

## 2023-12-14 PROCEDURE — 20610 DRAIN/INJ JOINT/BURSA W/O US: CPT

## 2023-12-14 PROCEDURE — 77002 NEEDLE LOCALIZATION BY XRAY: CPT

## 2023-12-14 RX ORDER — IOPAMIDOL 408 MG/ML
1 INJECTION, SOLUTION INTRATHECAL
Status: COMPLETED | OUTPATIENT
Start: 2023-12-14 | End: 2023-12-14

## 2023-12-14 RX ORDER — METHYLPREDNISOLONE ACETATE 80 MG/ML
80 INJECTION, SUSPENSION INTRA-ARTICULAR; INTRALESIONAL; INTRAMUSCULAR; SOFT TISSUE ONCE
Status: COMPLETED | OUTPATIENT
Start: 2023-12-14 | End: 2023-12-14

## 2023-12-14 RX ORDER — BUPIVACAINE HYDROCHLORIDE 2.5 MG/ML
4 INJECTION, SOLUTION EPIDURAL; INFILTRATION; INTRACAUDAL ONCE
Status: COMPLETED | OUTPATIENT
Start: 2023-12-14 | End: 2023-12-14

## 2023-12-14 RX ADMIN — BUPIVACAINE HYDROCHLORIDE 10 MG: 2.5 INJECTION, SOLUTION EPIDURAL; INFILTRATION; INTRACAUDAL; PERINEURAL at 11:48

## 2023-12-14 RX ADMIN — METHYLPREDNISOLONE ACETATE 80 MG: 80 INJECTION, SUSPENSION INTRA-ARTICULAR; INTRALESIONAL; INTRAMUSCULAR; SOFT TISSUE at 11:48

## 2023-12-14 RX ADMIN — IOPAMIDOL 1 ML: 408 INJECTION, SOLUTION INTRATHECAL at 11:48

## 2023-12-14 NOTE — PROGRESS NOTES
1142 Patient received in IR for shoulder injection. 1145 This procedure has been fully reviewed with the patient and written informed consent has been obtained. 1147 Procedure started with Dr. Aamir Wharton. 1152 Procedure completed; patient tolerated well. Band aid to left shoulder; no bleeding noted. 1155 Patient ambulated out to main radiology for discharge.

## 2024-07-31 ENCOUNTER — OFFICE VISIT (OUTPATIENT)
Dept: PULMONOLOGY | Age: 59
End: 2024-07-31
Payer: MEDICARE

## 2024-07-31 VITALS
TEMPERATURE: 97.8 F | SYSTOLIC BLOOD PRESSURE: 122 MMHG | HEIGHT: 69 IN | WEIGHT: 190.8 LBS | DIASTOLIC BLOOD PRESSURE: 84 MMHG | OXYGEN SATURATION: 96 % | BODY MASS INDEX: 28.26 KG/M2 | HEART RATE: 60 BPM

## 2024-07-31 DIAGNOSIS — Z87.891 PERSONAL HISTORY OF SMOKING: ICD-10-CM

## 2024-07-31 DIAGNOSIS — R06.09 DOE (DYSPNEA ON EXERTION): ICD-10-CM

## 2024-07-31 DIAGNOSIS — R06.2 WHEEZING: ICD-10-CM

## 2024-07-31 DIAGNOSIS — J44.9 CHRONIC OBSTRUCTIVE PULMONARY DISEASE, UNSPECIFIED COPD TYPE (HCC): Primary | ICD-10-CM

## 2024-07-31 DIAGNOSIS — Z87.891 PERSONAL HISTORY OF TOBACCO USE: ICD-10-CM

## 2024-07-31 PROCEDURE — G0296 VISIT TO DETERM LDCT ELIG: HCPCS | Performed by: INTERNAL MEDICINE

## 2024-07-31 PROCEDURE — 99204 OFFICE O/P NEW MOD 45 MIN: CPT | Performed by: INTERNAL MEDICINE

## 2024-07-31 RX ORDER — ATORVASTATIN CALCIUM 10 MG/1
10 TABLET, FILM COATED ORAL DAILY
COMMUNITY
Start: 2024-07-03

## 2024-07-31 RX ORDER — ALBUTEROL SULFATE 90 UG/1
2 AEROSOL, METERED RESPIRATORY (INHALATION) EVERY 6 HOURS PRN
COMMUNITY

## 2024-07-31 RX ORDER — CEFADROXIL 500 MG/1
500 CAPSULE ORAL 2 TIMES DAILY
COMMUNITY
Start: 2024-07-24

## 2024-07-31 RX ORDER — ACETAMINOPHEN 160 MG/5ML
SUSPENSION, ORAL (FINAL DOSE FORM) ORAL
COMMUNITY

## 2024-07-31 RX ORDER — NABUMETONE 750 MG/1
750 TABLET, FILM COATED ORAL 2 TIMES DAILY
COMMUNITY

## 2024-07-31 RX ORDER — PANTOPRAZOLE SODIUM 40 MG/1
40 TABLET, DELAYED RELEASE ORAL EVERY MORNING
COMMUNITY
Start: 2024-07-03

## 2024-07-31 RX ORDER — MULTIVITAMIN WITH IRON
250 TABLET ORAL DAILY
COMMUNITY

## 2024-07-31 NOTE — PROGRESS NOTES
complaints.  Genitourinary: No complaints.  Hematological: Negative.   Psychiatric/Behavioral: Negative.   Skin: No itching.     Physical exam   There were no vitals taken for this visit.   Wt Readings from Last 3 Encounters:   09/21/23 77.1 kg (170 lb)   10/22/22 77.1 kg (170 lb)   02/23/21 75.8 kg (167 lb)       Constitutional: Moderately built and moderately nourished. No distress.  HENT:   Head: Normocephalic and atraumatic.   Mouth/Throat: Oropharynx is clear and moist. No oral thrush. Mallampati   . Large tongue. Retrognathic.   Eyes: Conjunctivae are normal. PERRLA. No scleral icterus.   Neck: Neck supple. No JVD present. No tracheal deviation present.   Cardiovascular: Normal rate, regular rhythm, normal heart sounds. No murmur heard.   Pulmonary/Chest: Poor air movement, expiratory delay, expiratory wheezes  Abdominal: Soft. No distension. No tenderness.   Musculoskeletal: Normal range of motion.   Lymphadenopathy:  No cervical adenopathy.   Neurological: Alert and oriented to person, place, and time. No focal deficits.  Skin: Skin is warm and dry. Patient is not diaphoretic.   Psychiatric: Normal behavior with normal mood and affect.     Results   ECHO: No results found for this or any previous visit.  CT: No results found for this or any previous visit.  CXR: No results found for this or any previous visit.      Neck:17.25   Mal:3  Lung Nodule Screening     [x] Qualifies    [] Does not qualify   [] Declined    [] Completed     The USPSTF recommends annual screening for lung cancer with low-dose computed tomography (LDCT) in adults aged 50 to 80 years who have a 20 pack-year smoking history and currently smoke or have quit within the past 15 years. Screening should be discontinued once a person has not smoked for 15 years or develops a health problem that substantially limits life expectancy or the ability or willingness to have curative lung surgery.     Assessment      Diagnosis Orders   1. Chronic

## 2024-08-06 ENCOUNTER — TELEPHONE (OUTPATIENT)
Dept: PULMONOLOGY | Age: 59
End: 2024-08-06

## 2024-08-06 NOTE — TELEPHONE ENCOUNTER
Patient call asking about his blood test    Alpha-1-Antitrypsin       Please do Agapito-1-Anti Trypsin oropharyngeal swab test in the office today with available test kit.   I advised that was done in the office...  and then he stated he was looking on mychart and there was a medication on there that he doesn't take but he couldn't remember the name of it while on the phone with me today and I advised him that he can let the nurse know when he comes back in on 9/4. He voice his understanding.

## 2024-08-30 ENCOUNTER — HOSPITAL ENCOUNTER (OUTPATIENT)
Dept: CT IMAGING | Age: 59
Discharge: HOME | End: 2024-08-30
Attending: INTERNAL MEDICINE
Payer: MEDICARE

## 2024-08-30 ENCOUNTER — HOSPITAL ENCOUNTER (OUTPATIENT)
Dept: PULMONOLOGY | Age: 59
Discharge: HOME | End: 2024-08-30
Attending: INTERNAL MEDICINE
Payer: MEDICARE

## 2024-08-30 DIAGNOSIS — R06.09 DOE (DYSPNEA ON EXERTION): ICD-10-CM

## 2024-08-30 DIAGNOSIS — Z87.891 PERSONAL HISTORY OF SMOKING: ICD-10-CM

## 2024-08-30 DIAGNOSIS — Z87.891 PERSONAL HISTORY OF TOBACCO USE: ICD-10-CM

## 2024-08-30 DIAGNOSIS — R06.2 WHEEZING: ICD-10-CM

## 2024-08-30 DIAGNOSIS — J44.9 CHRONIC OBSTRUCTIVE PULMONARY DISEASE, UNSPECIFIED COPD TYPE (HCC): ICD-10-CM

## 2024-08-30 PROCEDURE — 71271 CT THORAX LUNG CANCER SCR C-: CPT

## 2024-08-30 PROCEDURE — 94060 EVALUATION OF WHEEZING: CPT

## 2024-08-30 PROCEDURE — 94726 PLETHYSMOGRAPHY LUNG VOLUMES: CPT

## 2024-08-30 PROCEDURE — 94729 DIFFUSING CAPACITY: CPT

## 2024-09-04 ENCOUNTER — OFFICE VISIT (OUTPATIENT)
Dept: PULMONOLOGY | Age: 59
End: 2024-09-04
Payer: MEDICARE

## 2024-09-04 VITALS
BODY MASS INDEX: 28.79 KG/M2 | TEMPERATURE: 98.1 F | SYSTOLIC BLOOD PRESSURE: 124 MMHG | HEART RATE: 76 BPM | DIASTOLIC BLOOD PRESSURE: 82 MMHG | WEIGHT: 194.4 LBS | HEIGHT: 69 IN | OXYGEN SATURATION: 96 %

## 2024-09-04 DIAGNOSIS — J44.9 COPD, SEVERE (HCC): Primary | ICD-10-CM

## 2024-09-04 DIAGNOSIS — Z87.891 PERSONAL HISTORY OF SMOKING: ICD-10-CM

## 2024-09-04 DIAGNOSIS — J84.10 GRANULOMATOUS LUNG DISEASE (HCC): ICD-10-CM

## 2024-09-04 PROCEDURE — 99214 OFFICE O/P EST MOD 30 MIN: CPT | Performed by: INTERNAL MEDICINE

## 2024-09-04 RX ORDER — CELECOXIB 200 MG/1
200 CAPSULE ORAL 2 TIMES DAILY
COMMUNITY

## 2024-09-04 RX ORDER — FLUTICASONE FUROATE, UMECLIDINIUM BROMIDE AND VILANTEROL TRIFENATATE 100; 62.5; 25 UG/1; UG/1; UG/1
1 POWDER RESPIRATORY (INHALATION) DAILY
COMMUNITY
End: 2024-09-04

## 2024-09-04 RX ORDER — ALBUTEROL SULFATE 90 UG/1
2 AEROSOL, METERED RESPIRATORY (INHALATION) EVERY 6 HOURS PRN
Qty: 18 G | Refills: 5 | Status: SHIPPED | OUTPATIENT
Start: 2024-09-04
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
## 2024-09-04 NOTE — PROGRESS NOTES
Neck Circumference -   17.25  Mallampati - 3    Lung Nodule Screening     [x] Qualifies    [] Does not qualify   [] Declined    [] Completed

## 2024-11-06 ENCOUNTER — HOSPITAL ENCOUNTER (OUTPATIENT)
Dept: MRI IMAGING | Age: 59
Discharge: HOME | End: 2024-11-06
Attending: ORTHOPAEDIC SURGERY
Payer: MEDICARE

## 2024-11-06 DIAGNOSIS — M67.814 TENDINOSIS OF LEFT ROTATOR CUFF: ICD-10-CM

## 2024-11-06 PROCEDURE — 73221 MRI JOINT UPR EXTREM W/O DYE: CPT

## 2024-11-21 RX ORDER — FLUTICASONE FUROATE, UMECLIDINIUM BROMIDE AND VILANTEROL TRIFENATATE 100; 62.5; 25 UG/1; UG/1; UG/1
POWDER RESPIRATORY (INHALATION)
Qty: 180 EACH | Refills: 3 | Status: SHIPPED | OUTPATIENT
Start: 2024-11-21

## 2025-01-09 ENCOUNTER — TRANSCRIBE ORDERS (OUTPATIENT)
Dept: ADMINISTRATIVE | Age: 60
End: 2025-01-09

## 2025-01-09 DIAGNOSIS — R07.89 OTHER CHEST PAIN: Primary | ICD-10-CM

## 2025-01-13 ENCOUNTER — HOSPITAL ENCOUNTER (OUTPATIENT)
Age: 60
Discharge: HOME | End: 2025-01-15
Attending: FAMILY MEDICINE
Payer: MEDICARE

## 2025-01-13 ENCOUNTER — HOSPITAL ENCOUNTER (OUTPATIENT)
Dept: NUCLEAR MEDICINE | Age: 60
Discharge: HOME | End: 2025-01-13
Attending: FAMILY MEDICINE
Payer: MEDICARE

## 2025-01-13 DIAGNOSIS — R07.89 OTHER CHEST PAIN: ICD-10-CM

## 2025-01-13 LAB
NUC STRESS EJECTION FRACTION: 64 %
STRESS BASELINE DIAS BP: 78 MMHG
STRESS BASELINE HR: 70 BPM
STRESS BASELINE ST DEPRESSION: 0 MM
STRESS BASELINE SYS BP: 140 MMHG
STRESS ESTIMATED WORKLOAD: 1 METS
STRESS EXERCISE DUR MIN: 3 MIN
STRESS EXERCISE DUR SEC: 0 SEC
STRESS PEAK DIAS BP: 73 MMHG
STRESS PEAK SYS BP: 144 MMHG
STRESS PERCENT HR ACHIEVED: 75 %
STRESS POST PEAK HR: 121 BPM
STRESS RATE PRESSURE PRODUCT: NORMAL BPM*MMHG
STRESS ST DEPRESSION: 0 MM
STRESS STAGE 1 BP: NORMAL MMHG
STRESS STAGE 1 DURATION: 1 MIN:SEC
STRESS STAGE 1 HR: 90 BPM
STRESS STAGE 2 BP: NORMAL MMHG
STRESS STAGE 2 DURATION: 1 MIN:SEC
STRESS STAGE 2 HR: 115 BPM
STRESS STAGE 3 BP: NORMAL MMHG
STRESS STAGE 3 DURATION: 1 MIN:SEC
STRESS STAGE 3 HR: 108 BPM
STRESS STAGE RECOVERY 1 BP: NORMAL MMHG
STRESS STAGE RECOVERY 1 DURATION: 1 MIN:SEC
STRESS STAGE RECOVERY 1 HR: 103 BPM
STRESS STAGE RECOVERY 2 BP: NORMAL MMHG
STRESS STAGE RECOVERY 2 DURATION: 1 MIN:SEC
STRESS STAGE RECOVERY 2 HR: 86 BPM
STRESS STAGE RECOVERY 3 BP: NORMAL MMHG
STRESS STAGE RECOVERY 3 DURATION: 1 MIN:SEC
STRESS STAGE RECOVERY 3 HR: 96 BPM
STRESS STAGE RECOVERY 4 BP: NORMAL MMHG
STRESS STAGE RECOVERY 4 DURATION: 1 MIN:SEC
STRESS STAGE RECOVERY 4 HR: 83 BPM
STRESS TARGET HR: 161 BPM
TID: 1.15

## 2025-01-13 PROCEDURE — 93018 CV STRESS TEST I&R ONLY: CPT | Performed by: INTERNAL MEDICINE

## 2025-01-13 PROCEDURE — 93016 CV STRESS TEST SUPVJ ONLY: CPT | Performed by: INTERNAL MEDICINE

## 2025-01-13 PROCEDURE — 3430000000 HC RX DIAGNOSTIC RADIOPHARMACEUTICAL: Performed by: FAMILY MEDICINE

## 2025-01-13 PROCEDURE — 78452 HT MUSCLE IMAGE SPECT MULT: CPT

## 2025-01-13 PROCEDURE — 93017 CV STRESS TEST TRACING ONLY: CPT

## 2025-01-13 PROCEDURE — 6360000002 HC RX W HCPCS: Performed by: FAMILY MEDICINE

## 2025-01-13 PROCEDURE — 78452 HT MUSCLE IMAGE SPECT MULT: CPT | Performed by: INTERNAL MEDICINE

## 2025-01-13 PROCEDURE — A9500 TC99M SESTAMIBI: HCPCS | Performed by: FAMILY MEDICINE

## 2025-01-13 RX ORDER — TETRAKIS(2-METHOXYISOBUTYLISOCYANIDE)COPPER(I) TETRAFLUOROBORATE 1 MG/ML
32.5 INJECTION, POWDER, LYOPHILIZED, FOR SOLUTION INTRAVENOUS
Status: COMPLETED | OUTPATIENT
Start: 2025-01-13 | End: 2025-01-13

## 2025-01-13 RX ORDER — TETRAKIS(2-METHOXYISOBUTYLISOCYANIDE)COPPER(I) TETRAFLUOROBORATE 1 MG/ML
9.2 INJECTION, POWDER, LYOPHILIZED, FOR SOLUTION INTRAVENOUS
Status: COMPLETED | OUTPATIENT
Start: 2025-01-13 | End: 2025-01-13

## 2025-01-13 RX ORDER — REGADENOSON 0.08 MG/ML
0.4 INJECTION, SOLUTION INTRAVENOUS
Status: COMPLETED | OUTPATIENT
Start: 2025-01-13 | End: 2025-01-13

## 2025-01-13 RX ADMIN — REGADENOSON 0.4 MG: 0.08 INJECTION, SOLUTION INTRAVENOUS at 07:39

## 2025-01-13 RX ADMIN — Medication 32.5 MILLICURIE: at 07:41

## 2025-01-13 RX ADMIN — Medication 9.2 MILLICURIE: at 06:50

## 2025-02-13 NOTE — PROGRESS NOTES
80 years who have a 20 pack-year smoking history and currently smoke or have quit within the past 15 years. Screening should be discontinued once a person has not smoked for 15 years or develops a health problem that substantially limits life expectancy or the ability or willingness to have curative lung surgery.     CT Chest:   CT LUNG SCREENING (INITIAL/ANNUAL) 08/30/2024    Narrative  NONCONTRAST SCREENING CT CHEST:    HISTORY: History of smoking. 50 pack year history of smoking.    TECHNIQUE:  1 mm axial imaging of the chest and upper abdomen without IV contrast.    All CT scans at this facility use dose modulation, iterative reconstruction,  and/or weight based dosing when appropriate to reduce the radiation dose to as  low as reasonably achievable.    COMPARISON: No prior examinations.    FINDINGS:  LUNGS NODULES:  There are some calcified granulomas. There are no suspicious masses or nodules.    LYMPHADENOPATHY:  There are some calcified mediastinal lymph nodes. There are no pathologically  enlarged lymph nodes.    OTHER (LUNGS/MEDIASTINUM/MUSCULOSKELETAL/ABDOMEN):  There are emphysematous changes. There is scarring at the lung apices. There is  no pericardial or pleural effusion. The heart is normal in size. There is no  pneumothorax. Calcified granulomas are in the spleen.    Impression  1. There are no suspicious masses or nodules within the lung fields.  2. There are no pathologically enlarged lymph nodes.  3. LUNGRADS 1      The LUNG RADS RECOMMENDATIONS for monitoring lung nodules listed below (ACR-  Lung-RADS Version 1.0 Assessment Categories)    LUNG RADS RECOMMENDATIONS;  1.  Normal, continue annual screening  2.  Benign appearance or behavior, continue annual screening  3.  6 month CT recommended  4A.  3 month CT recommended; may consider PET/CT  4B.  Additional diagnostics and/or tissue sampling recommended  4X.  Additional diagnostics and/or tissue sampling recommended    **This report has been

## 2025-02-14 ENCOUNTER — OFFICE VISIT (OUTPATIENT)
Dept: PULMONOLOGY | Age: 60
End: 2025-02-14
Payer: MEDICARE

## 2025-02-14 VITALS
DIASTOLIC BLOOD PRESSURE: 82 MMHG | WEIGHT: 186.3 LBS | OXYGEN SATURATION: 92 % | HEIGHT: 69 IN | HEART RATE: 100 BPM | BODY MASS INDEX: 27.59 KG/M2 | TEMPERATURE: 97.8 F | SYSTOLIC BLOOD PRESSURE: 126 MMHG

## 2025-02-14 DIAGNOSIS — J43.9 PULMONARY EMPHYSEMA, UNSPECIFIED EMPHYSEMA TYPE (HCC): Primary | ICD-10-CM

## 2025-02-14 DIAGNOSIS — Z87.891 HISTORY OF SMOKING: ICD-10-CM

## 2025-02-14 PROCEDURE — 99214 OFFICE O/P EST MOD 30 MIN: CPT

## 2025-03-13 RX ORDER — FLUTICASONE FUROATE, UMECLIDINIUM BROMIDE AND VILANTEROL TRIFENATATE 100; 62.5; 25 UG/1; UG/1; UG/1
1 POWDER RESPIRATORY (INHALATION) DAILY
Qty: 180 EACH | Refills: 3 | Status: SHIPPED | OUTPATIENT
Start: 2025-03-13

## 2025-04-18 ENCOUNTER — TRANSCRIBE ORDERS (OUTPATIENT)
Dept: ADMINISTRATIVE | Age: 60
End: 2025-04-18

## 2025-04-18 DIAGNOSIS — R19.09 GROIN LUMP: Primary | ICD-10-CM

## 2025-04-29 ENCOUNTER — HOSPITAL ENCOUNTER (OUTPATIENT)
Dept: ULTRASOUND IMAGING | Age: 60
Discharge: HOME | End: 2025-04-29
Attending: FAMILY MEDICINE
Payer: MEDICARE

## 2025-04-29 DIAGNOSIS — R19.09 GROIN LUMP: ICD-10-CM

## 2025-04-29 PROCEDURE — 76882 US LMTD JT/FCL EVL NVASC XTR: CPT

## 2025-06-24 ENCOUNTER — TRANSCRIBE ORDERS (OUTPATIENT)
Dept: ADMINISTRATIVE | Age: 60
End: 2025-06-24

## 2025-06-24 DIAGNOSIS — M16.12 PRIMARY OSTEOARTHRITIS OF LEFT HIP: ICD-10-CM

## 2025-06-24 DIAGNOSIS — M25.551 PAIN IN RIGHT HIP: Primary | ICD-10-CM

## 2025-06-24 DIAGNOSIS — M53.3 PAIN OF RIGHT SACROILIAC JOINT: ICD-10-CM

## 2025-07-23 ENCOUNTER — HOSPITAL ENCOUNTER (OUTPATIENT)
Dept: MRI IMAGING | Age: 60
Discharge: HOME | End: 2025-07-23
Payer: MEDICARE

## 2025-07-23 DIAGNOSIS — M25.551 PAIN IN RIGHT HIP: ICD-10-CM

## 2025-07-23 DIAGNOSIS — M16.12 PRIMARY OSTEOARTHRITIS OF LEFT HIP: ICD-10-CM

## 2025-07-23 DIAGNOSIS — M53.3 PAIN OF RIGHT SACROILIAC JOINT: ICD-10-CM

## 2025-07-23 PROCEDURE — 73721 MRI JNT OF LWR EXTRE W/O DYE: CPT

## 2025-08-08 ENCOUNTER — HOSPITAL ENCOUNTER (OUTPATIENT)
Dept: INTERVENTIONAL RADIOLOGY/VASCULAR | Age: 60
Discharge: HOME | End: 2025-08-08
Payer: MEDICARE

## 2025-08-08 DIAGNOSIS — M53.3 PAIN OF RIGHT SACROILIAC JOINT: ICD-10-CM

## 2025-08-08 PROCEDURE — 6360000004 HC RX CONTRAST MEDICATION: Performed by: RADIOLOGY

## 2025-08-08 PROCEDURE — 27096 INJECT SACROILIAC JOINT: CPT

## 2025-08-08 PROCEDURE — 2709999900 IR INJ SI JOINT W FLUORO W OR WO ARTHROGRAPHY RIGHT

## 2025-08-08 PROCEDURE — 6360000002 HC RX W HCPCS: Performed by: RADIOLOGY

## 2025-08-08 RX ORDER — BUPIVACAINE HYDROCHLORIDE 2.5 MG/ML
5 INJECTION, SOLUTION EPIDURAL; INFILTRATION; INTRACAUDAL; PERINEURAL ONCE
Status: COMPLETED | OUTPATIENT
Start: 2025-08-08 | End: 2025-08-08

## 2025-08-08 RX ORDER — IOPAMIDOL 408 MG/ML
1 INJECTION, SOLUTION INTRATHECAL ONCE
Status: COMPLETED | OUTPATIENT
Start: 2025-08-08 | End: 2025-08-08

## 2025-08-08 RX ORDER — METHYLPREDNISOLONE ACETATE 80 MG/ML
80 INJECTION, SUSPENSION INTRA-ARTICULAR; INTRALESIONAL; INTRAMUSCULAR; SOFT TISSUE ONCE
Status: COMPLETED | OUTPATIENT
Start: 2025-08-08 | End: 2025-08-08

## 2025-08-08 RX ADMIN — BUPIVACAINE HYDROCHLORIDE 4 ML: 2.5 INJECTION, SOLUTION EPIDURAL; INFILTRATION; INTRACAUDAL; PERINEURAL at 14:32

## 2025-08-08 RX ADMIN — METHYLPREDNISOLONE ACETATE 80 MG: 80 INJECTION, SUSPENSION INTRA-ARTICULAR; INTRALESIONAL; INTRAMUSCULAR; SOFT TISSUE at 14:32

## 2025-08-08 RX ADMIN — IOPAMIDOL 1 ML: 408 INJECTION, SOLUTION INTRATHECAL at 14:32

## 2025-08-11 ENCOUNTER — HOSPITAL ENCOUNTER (OUTPATIENT)
Dept: CT IMAGING | Age: 60
Discharge: HOME | End: 2025-08-11
Payer: MEDICARE

## 2025-08-11 DIAGNOSIS — Z87.891 HISTORY OF SMOKING: ICD-10-CM

## 2025-08-11 PROCEDURE — 71250 CT THORAX DX C-: CPT

## 2025-08-18 ENCOUNTER — TELEPHONE (OUTPATIENT)
Dept: PULMONOLOGY | Age: 60
End: 2025-08-18

## 2025-08-18 ENCOUNTER — OFFICE VISIT (OUTPATIENT)
Dept: PULMONOLOGY | Age: 60
End: 2025-08-18

## 2025-08-18 VITALS
HEIGHT: 69 IN | TEMPERATURE: 98.8 F | BODY MASS INDEX: 26.28 KG/M2 | HEART RATE: 68 BPM | SYSTOLIC BLOOD PRESSURE: 122 MMHG | WEIGHT: 177.4 LBS | DIASTOLIC BLOOD PRESSURE: 80 MMHG | OXYGEN SATURATION: 94 %

## 2025-08-18 DIAGNOSIS — J43.9 PULMONARY EMPHYSEMA, UNSPECIFIED EMPHYSEMA TYPE (HCC): Primary | ICD-10-CM

## 2025-08-18 DIAGNOSIS — Z87.891 HISTORY OF SMOKING: ICD-10-CM

## 2025-08-18 RX ORDER — FLUTICASONE FUROATE, UMECLIDINIUM BROMIDE AND VILANTEROL TRIFENATATE 100; 62.5; 25 UG/1; UG/1; UG/1
1 POWDER RESPIRATORY (INHALATION) DAILY
Qty: 180 EACH | Refills: 3 | Status: SHIPPED | OUTPATIENT
Start: 2025-08-18

## (undated) DEVICE — SUTURE VCRL SZ 2-0 L27IN ABSRB UD L36MM CP-1 1/2 CIR REV J266H

## (undated) DEVICE — GLOVE ORANGE PI 8 1/2   MSG9085

## (undated) DEVICE — DRAPE MICSCP W132XL406CM LENS DIA68MM W VARI LENS2 FOR LEICA

## (undated) DEVICE — SOLUTION IV 1000ML 0.45% SOD CHL PH 5 INJ USP VIAFLX PLAS

## (undated) DEVICE — CODMAN® SURGICAL PATTIES 1/2" X 1/2" (1.27CM X 1.27CM): Brand: CODMAN®

## (undated) DEVICE — ELECTRODE PT RET AD L9FT HI MOIST COND ADH HYDRGEL CORDED

## (undated) DEVICE — COVER ARMBRD W13XL28.5IN IMPERV BLU FOR OP RM

## (undated) DEVICE — KIT INF CTRL 2OZ LUB TBNG L12FT DBL END BRSH SYR OP4

## (undated) DEVICE — SUTURE ETHLN SZ 2-0 L30IN NONABSORBABLE BLK L36MM FSLX 3/8 1674H

## (undated) DEVICE — ZINACTIVE USE 2539609 APPLICATOR MEDICATED 10.5 CC SOLUTION HI LT ORNG CHLORAPREP

## (undated) DEVICE — GLOVE SURG SZ 65 THK91MIL LTX FREE SYN POLYISOPRENE

## (undated) DEVICE — EVACUATOR SURG 100CC SIL BLB SUCT RESVR FOR CLS WND DRNGE

## (undated) DEVICE — TAPE,CLOTH/SILK,CURAD,3"X10YD,LF,40/CS: Brand: CURAD

## (undated) DEVICE — COLLAR CERV UNIV AD L13-19IN TRACH OPN TWO PC RIG POLYETH

## (undated) DEVICE — BIOGUARD A/W CLEANING ADAPTER

## (undated) DEVICE — DRAIN SURG FLAT W7MMXL20CM FULL PERF

## (undated) DEVICE — SET ADMIN 25ML L117IN PMP MOD CK VLV RLER CLMP 2 SMRTSITE

## (undated) DEVICE — IV START KIT: Brand: MEDLINE INDUSTRIES, INC.

## (undated) DEVICE — GAUZE,SPONGE,4"X4",12PLY,STERILE,LF,2'S: Brand: MEDLINE

## (undated) DEVICE — Device

## (undated) DEVICE — SNARE POLYP SM W13MMXL240CM SHTH DIA2.4MM OVL FLX DISP

## (undated) DEVICE — YANKAUER,BULB TIP,W/O VENT,RIGID,STERILE: Brand: MEDLINE

## (undated) DEVICE — BLADE ES L4IN INSUL EDGE

## (undated) DEVICE — LINER SUCT CANSTR 1500CC SEMI RIG W/ POR HYDROPHOBIC SHUT

## (undated) DEVICE — Device: Brand: FABCO

## (undated) DEVICE — 1010 S-DRAPE TOWEL DRAPE 10/BX: Brand: STERI-DRAPE™

## (undated) DEVICE — TRAP POLYP ETRAP

## (undated) DEVICE — TUBING IV STOPCOCK 48 CM 3 W

## (undated) DEVICE — TUBING, SUCTION, 1/4" X 20', STRAIGHT: Brand: MEDLINE INDUSTRIES, INC.

## (undated) DEVICE — 3M™ STERI-STRIP™ COMPOUND BENZOIN TINCTURE 40 BAGS/CARTON 4 CARTONS/CASE C1544: Brand: 3M™ STERI-STRIP™

## (undated) DEVICE — STRIP,CLOSURE,WOUND,MEDI-STRIP,1/2X4: Brand: MEDLINE

## (undated) DEVICE — SOLUTION IV IRRIG WATER 500ML POUR BRL ST 2F7113

## (undated) DEVICE — 4-PORT MANIFOLD: Brand: NEPTUNE 2

## (undated) DEVICE — DRESSING TRNSPAR W5XL4.5IN FLM SHT SEMIPERMEABLE WIND

## (undated) DEVICE — BIPOLAR SEALER 23-112-1 AQM 6.0: Brand: AQUAMANTYS ®

## (undated) DEVICE — CATHETER ETER IV 22GA L1IN POLYUR STR RADPQ INTROCAN SFTY

## (undated) DEVICE — BLADE CLIPPER GEN PURP NS